# Patient Record
Sex: MALE | Race: WHITE | Employment: OTHER | URBAN - METROPOLITAN AREA
[De-identification: names, ages, dates, MRNs, and addresses within clinical notes are randomized per-mention and may not be internally consistent; named-entity substitution may affect disease eponyms.]

---

## 2019-01-01 ENCOUNTER — TELEPHONE (OUTPATIENT)
Dept: CARDIOLOGY CLINIC | Age: 69
End: 2019-01-01

## 2019-01-01 ENCOUNTER — PROCEDURE VISIT (OUTPATIENT)
Dept: CARDIOLOGY CLINIC | Age: 69
End: 2019-01-01

## 2019-01-01 ENCOUNTER — APPOINTMENT (OUTPATIENT)
Dept: INTERVENTIONAL RADIOLOGY/VASCULAR | Age: 69
DRG: 291 | End: 2019-01-01
Payer: COMMERCIAL

## 2019-01-01 ENCOUNTER — HOSPITAL ENCOUNTER (OUTPATIENT)
Age: 69
Discharge: HOME | End: 2019-09-09
Payer: COMMERCIAL

## 2019-01-01 ENCOUNTER — HOSPITAL ENCOUNTER (INPATIENT)
Age: 69
LOS: 5 days | Discharge: HOME | DRG: 291 | End: 2019-08-29
Attending: EMERGENCY MEDICINE | Admitting: HOSPITALIST
Payer: COMMERCIAL

## 2019-01-01 ENCOUNTER — OFFICE VISIT (OUTPATIENT)
Dept: CARDIOLOGY CLINIC | Age: 69
End: 2019-01-01
Payer: COMMERCIAL

## 2019-01-01 ENCOUNTER — NURSE ONLY (OUTPATIENT)
Dept: CARDIOLOGY CLINIC | Age: 69
End: 2019-01-01

## 2019-01-01 ENCOUNTER — INITIAL CONSULT (OUTPATIENT)
Dept: CARDIOLOGY CLINIC | Age: 69
End: 2019-01-01
Payer: COMMERCIAL

## 2019-01-01 ENCOUNTER — HOSPITAL ENCOUNTER (OUTPATIENT)
Age: 69
Discharge: HOME | End: 2019-10-03
Payer: COMMERCIAL

## 2019-01-01 ENCOUNTER — APPOINTMENT (OUTPATIENT)
Dept: ULTRASOUND IMAGING | Age: 69
DRG: 291 | End: 2019-01-01
Payer: COMMERCIAL

## 2019-01-01 ENCOUNTER — HOSPITAL ENCOUNTER (OUTPATIENT)
Dept: WOUND CARE | Age: 69
Discharge: HOME | End: 2019-11-13
Payer: COMMERCIAL

## 2019-01-01 ENCOUNTER — HOSPITAL ENCOUNTER (OUTPATIENT)
Age: 69
Discharge: HOME | End: 2019-09-03
Payer: COMMERCIAL

## 2019-01-01 ENCOUNTER — HOSPITAL ENCOUNTER (OUTPATIENT)
Dept: WOUND CARE | Age: 69
Discharge: HOME | End: 2019-11-06
Payer: COMMERCIAL

## 2019-01-01 ENCOUNTER — HOSPITAL ENCOUNTER (OUTPATIENT)
Dept: GENERAL RADIOLOGY | Age: 69
Discharge: HOME | End: 2019-10-03
Payer: COMMERCIAL

## 2019-01-01 ENCOUNTER — APPOINTMENT (OUTPATIENT)
Dept: GENERAL RADIOLOGY | Age: 69
DRG: 291 | End: 2019-01-01
Attending: THORACIC SURGERY (CARDIOTHORACIC VASCULAR SURGERY)
Payer: COMMERCIAL

## 2019-01-01 ENCOUNTER — PROCEDURE VISIT (OUTPATIENT)
Dept: CARDIOLOGY CLINIC | Age: 69
End: 2019-01-01
Payer: COMMERCIAL

## 2019-01-01 ENCOUNTER — APPOINTMENT (OUTPATIENT)
Dept: GENERAL RADIOLOGY | Age: 69
DRG: 291 | End: 2019-01-01
Payer: COMMERCIAL

## 2019-01-01 ENCOUNTER — HOSPITAL ENCOUNTER (INPATIENT)
Age: 69
LOS: 1 days | Discharge: HOME | DRG: 291 | End: 2019-05-01
Attending: EMERGENCY MEDICINE | Admitting: INTERNAL MEDICINE
Payer: COMMERCIAL

## 2019-01-01 ENCOUNTER — HOSPITAL ENCOUNTER (INPATIENT)
Age: 69
LOS: 2 days | Discharge: HOME HEALTH | DRG: 291 | End: 2019-10-12
Attending: THORACIC SURGERY (CARDIOTHORACIC VASCULAR SURGERY) | Admitting: INTERNAL MEDICINE
Payer: COMMERCIAL

## 2019-01-01 ENCOUNTER — APPOINTMENT (OUTPATIENT)
Dept: INTERVENTIONAL RADIOLOGY/VASCULAR | Age: 69
DRG: 981 | End: 2019-01-01
Attending: INTERNAL MEDICINE
Payer: COMMERCIAL

## 2019-01-01 ENCOUNTER — ANESTHESIA EVENT (OUTPATIENT)
Dept: OPERATING ROOM | Age: 69
End: 2019-01-01

## 2019-01-01 ENCOUNTER — HOSPITAL ENCOUNTER (INPATIENT)
Age: 69
LOS: 2 days | Discharge: HOME | DRG: 981 | End: 2019-09-11
Attending: INTERNAL MEDICINE | Admitting: INTERNAL MEDICINE
Payer: COMMERCIAL

## 2019-01-01 ENCOUNTER — HOSPITAL ENCOUNTER (OUTPATIENT)
Dept: PREADMISSION TESTING | Age: 69
Discharge: HOME | End: 2019-10-07
Payer: COMMERCIAL

## 2019-01-01 ENCOUNTER — ANESTHESIA (OUTPATIENT)
Dept: OPERATING ROOM | Age: 69
End: 2019-01-01

## 2019-01-01 ENCOUNTER — HOSPITAL ENCOUNTER (EMERGENCY)
Age: 69
End: 2019-11-18
Attending: EMERGENCY MEDICINE
Payer: COMMERCIAL

## 2019-01-01 VITALS
TEMPERATURE: 97 F | SYSTOLIC BLOOD PRESSURE: 106 MMHG | HEART RATE: 98 BPM | RESPIRATION RATE: 20 BRPM | DIASTOLIC BLOOD PRESSURE: 77 MMHG

## 2019-01-01 VITALS
SYSTOLIC BLOOD PRESSURE: 117 MMHG | BODY MASS INDEX: 28.59 KG/M2 | HEART RATE: 79 BPM | RESPIRATION RATE: 15 BRPM | HEIGHT: 70 IN | WEIGHT: 199.7 LBS | DIASTOLIC BLOOD PRESSURE: 37 MMHG | TEMPERATURE: 97.6 F | OXYGEN SATURATION: 96 %

## 2019-01-01 VITALS
BODY MASS INDEX: 29.35 KG/M2 | OXYGEN SATURATION: 98 % | TEMPERATURE: 98.5 F | SYSTOLIC BLOOD PRESSURE: 115 MMHG | HEART RATE: 78 BPM | DIASTOLIC BLOOD PRESSURE: 45 MMHG | WEIGHT: 205 LBS | RESPIRATION RATE: 20 BRPM | HEIGHT: 70 IN

## 2019-01-01 VITALS
WEIGHT: 205 LBS | OXYGEN SATURATION: 98 % | BODY MASS INDEX: 29.35 KG/M2 | SYSTOLIC BLOOD PRESSURE: 131 MMHG | HEART RATE: 60 BPM | TEMPERATURE: 97.9 F | DIASTOLIC BLOOD PRESSURE: 98 MMHG | RESPIRATION RATE: 15 BRPM | HEIGHT: 70 IN

## 2019-01-01 VITALS
SYSTOLIC BLOOD PRESSURE: 100 MMHG | WEIGHT: 205 LBS | BODY MASS INDEX: 29.35 KG/M2 | HEIGHT: 70 IN | HEART RATE: 101 BPM | DIASTOLIC BLOOD PRESSURE: 64 MMHG

## 2019-01-01 VITALS
BODY MASS INDEX: 28.88 KG/M2 | TEMPERATURE: 97.9 F | RESPIRATION RATE: 16 BRPM | SYSTOLIC BLOOD PRESSURE: 130 MMHG | WEIGHT: 201.72 LBS | DIASTOLIC BLOOD PRESSURE: 92 MMHG | HEIGHT: 70 IN | OXYGEN SATURATION: 99 % | HEART RATE: 66 BPM

## 2019-01-01 VITALS — TEMPERATURE: 97.2 F | SYSTOLIC BLOOD PRESSURE: 108 MMHG | DIASTOLIC BLOOD PRESSURE: 60 MMHG

## 2019-01-01 VITALS
DIASTOLIC BLOOD PRESSURE: 68 MMHG | HEART RATE: 78 BPM | HEIGHT: 70 IN | OXYGEN SATURATION: 99 % | SYSTOLIC BLOOD PRESSURE: 118 MMHG | BODY MASS INDEX: 28.72 KG/M2 | RESPIRATION RATE: 16 BRPM | WEIGHT: 200.6 LBS

## 2019-01-01 VITALS
BODY MASS INDEX: 29.15 KG/M2 | HEIGHT: 70 IN | WEIGHT: 203.6 LBS | HEART RATE: 72 BPM | DIASTOLIC BLOOD PRESSURE: 62 MMHG | SYSTOLIC BLOOD PRESSURE: 128 MMHG

## 2019-01-01 VITALS
RESPIRATION RATE: 20 BRPM | WEIGHT: 197.31 LBS | HEIGHT: 70 IN | BODY MASS INDEX: 28.25 KG/M2 | OXYGEN SATURATION: 92 % | SYSTOLIC BLOOD PRESSURE: 107 MMHG | DIASTOLIC BLOOD PRESSURE: 79 MMHG | HEART RATE: 82 BPM | TEMPERATURE: 97.9 F

## 2019-01-01 VITALS
HEIGHT: 70 IN | HEART RATE: 80 BPM | BODY MASS INDEX: 29.92 KG/M2 | DIASTOLIC BLOOD PRESSURE: 72 MMHG | SYSTOLIC BLOOD PRESSURE: 122 MMHG | WEIGHT: 209 LBS

## 2019-01-01 VITALS
BODY MASS INDEX: 30.06 KG/M2 | OXYGEN SATURATION: 98 % | RESPIRATION RATE: 12 BRPM | HEIGHT: 70 IN | HEART RATE: 74 BPM | SYSTOLIC BLOOD PRESSURE: 116 MMHG | DIASTOLIC BLOOD PRESSURE: 62 MMHG | WEIGHT: 210 LBS

## 2019-01-01 VITALS
BODY MASS INDEX: 28.56 KG/M2 | SYSTOLIC BLOOD PRESSURE: 90 MMHG | HEIGHT: 70 IN | HEART RATE: 70 BPM | WEIGHT: 199.5 LBS | DIASTOLIC BLOOD PRESSURE: 50 MMHG

## 2019-01-01 VITALS
SYSTOLIC BLOOD PRESSURE: 130 MMHG | WEIGHT: 213 LBS | DIASTOLIC BLOOD PRESSURE: 76 MMHG | BODY MASS INDEX: 30.49 KG/M2 | HEART RATE: 55 BPM | HEIGHT: 70 IN

## 2019-01-01 VITALS — TEMPERATURE: 96.7 F

## 2019-01-01 DIAGNOSIS — I87.333 CHRONIC VENOUS HYPERTENSION (IDIOPATHIC) WITH ULCER AND INFLAMMATION OF BILATERAL LOWER EXTREMITY (CODE) (HCC): ICD-10-CM

## 2019-01-01 DIAGNOSIS — R06.02 SHORTNESS OF BREATH: ICD-10-CM

## 2019-01-01 DIAGNOSIS — E78.2 MIXED HYPERLIPIDEMIA: ICD-10-CM

## 2019-01-01 DIAGNOSIS — N18.9 CHRONIC KIDNEY DISEASE, UNSPECIFIED CKD STAGE: ICD-10-CM

## 2019-01-01 DIAGNOSIS — I25.5 ISCHEMIC CARDIOMYOPATHY: ICD-10-CM

## 2019-01-01 DIAGNOSIS — I73.9 CLAUDICATION (HCC): Primary | ICD-10-CM

## 2019-01-01 DIAGNOSIS — I25.10 CORONARY ARTERY DISEASE INVOLVING NATIVE CORONARY ARTERY OF NATIVE HEART WITHOUT ANGINA PECTORIS: ICD-10-CM

## 2019-01-01 DIAGNOSIS — R94.31 ABNORMAL EKG: Primary | ICD-10-CM

## 2019-01-01 DIAGNOSIS — I50.42 CHRONIC COMBINED SYSTOLIC AND DIASTOLIC CONGESTIVE HEART FAILURE (HCC): ICD-10-CM

## 2019-01-01 DIAGNOSIS — I25.810 CORONARY ARTERY DISEASE INVOLVING AUTOLOGOUS VEIN CORONARY BYPASS GRAFT WITHOUT ANGINA PECTORIS: ICD-10-CM

## 2019-01-01 DIAGNOSIS — I50.9 ACUTE HEART FAILURE, UNSPECIFIED HEART FAILURE TYPE (HCC): Primary | ICD-10-CM

## 2019-01-01 DIAGNOSIS — Z95.810 STATUS POST IMPLANTATION OF AUTOMATIC CARDIOVERTER/DEFIBRILLATOR (AICD): Primary | ICD-10-CM

## 2019-01-01 DIAGNOSIS — E11.00 TYPE 2 DIABETES MELLITUS WITH HYPEROSMOLARITY WITHOUT COMA, WITHOUT LONG-TERM CURRENT USE OF INSULIN (HCC): ICD-10-CM

## 2019-01-01 DIAGNOSIS — L97.822 NON-PRESSURE CHRONIC ULCER OF OTHER PART OF LEFT LOWER LEG WITH FAT LAYER EXPOSED (HCC): Primary | ICD-10-CM

## 2019-01-01 DIAGNOSIS — L97.812 NON-PRESSURE CHRONIC ULCER OF OTHER PART OF RIGHT LOWER LEG WITH FAT LAYER EXPOSED (HCC): ICD-10-CM

## 2019-01-01 DIAGNOSIS — Z95.810 ICD (IMPLANTABLE CARDIOVERTER-DEFIBRILLATOR), DUAL, IN SITU: Primary | ICD-10-CM

## 2019-01-01 DIAGNOSIS — I25.5 ISCHEMIC CARDIOMYOPATHY: Primary | ICD-10-CM

## 2019-01-01 DIAGNOSIS — E87.70 HYPERVOLEMIA, UNSPECIFIED HYPERVOLEMIA TYPE: ICD-10-CM

## 2019-01-01 DIAGNOSIS — R60.0 LEG EDEMA: ICD-10-CM

## 2019-01-01 DIAGNOSIS — N18.6 ESRD (END STAGE RENAL DISEASE) (HCC): ICD-10-CM

## 2019-01-01 DIAGNOSIS — I10 ESSENTIAL HYPERTENSION: ICD-10-CM

## 2019-01-01 DIAGNOSIS — L97.822 NON-PRESSURE CHRONIC ULCER OF OTHER PART OF LEFT LOWER LEG WITH FAT LAYER EXPOSED (HCC): ICD-10-CM

## 2019-01-01 DIAGNOSIS — K59.00 CONSTIPATION, UNSPECIFIED CONSTIPATION TYPE: ICD-10-CM

## 2019-01-01 DIAGNOSIS — R77.8 ELEVATED TROPONIN: ICD-10-CM

## 2019-01-01 DIAGNOSIS — Z86.79 HX OF LEFT BUNDLE BRANCH BLOCK: ICD-10-CM

## 2019-01-01 DIAGNOSIS — Z95.810 ICD (IMPLANTABLE CARDIOVERTER-DEFIBRILLATOR), DUAL, IN SITU: ICD-10-CM

## 2019-01-01 DIAGNOSIS — I46.9 CARDIAC ARREST (HCC): Primary | ICD-10-CM

## 2019-01-01 DIAGNOSIS — I73.9 CLAUDICATION (HCC): ICD-10-CM

## 2019-01-01 DIAGNOSIS — I10 ESSENTIAL HYPERTENSION: Primary | ICD-10-CM

## 2019-01-01 DIAGNOSIS — Z45.02 ICD (IMPLANTABLE CARDIOVERTER-DEFIBRILLATOR) BATTERY DEPLETION: Primary | ICD-10-CM

## 2019-01-01 DIAGNOSIS — I50.42 CHRONIC COMBINED SYSTOLIC AND DIASTOLIC CONGESTIVE HEART FAILURE (HCC): Primary | ICD-10-CM

## 2019-01-01 DIAGNOSIS — R06.00 DYSPNEA, UNSPECIFIED TYPE: Primary | ICD-10-CM

## 2019-01-01 DIAGNOSIS — Z95.810 ICD (IMPLANTABLE CARDIOVERTER-DEFIBRILLATOR) IN PLACE: ICD-10-CM

## 2019-01-01 LAB
ABO/RH: NORMAL
ACID FAST SMEAR: NORMAL
ALBUMIN SERPL-MCNC: 3.7 GM/DL (ref 3.4–5)
ALBUMIN SERPL-MCNC: 3.7 GM/DL (ref 3.4–5)
ALBUMIN SERPL-MCNC: 3.8 GM/DL (ref 3.4–5)
ALP BLD-CCNC: 116 IU/L (ref 40–129)
ALP BLD-CCNC: 152 IU/L (ref 40–128)
ALP BLD-CCNC: 168 IU/L (ref 40–128)
ALP BLD-CCNC: 169 IU/L (ref 40–129)
ALT SERPL-CCNC: 21 U/L (ref 10–40)
ALT SERPL-CCNC: 33 U/L (ref 10–40)
ALT SERPL-CCNC: 43 U/L (ref 10–40)
ALT SERPL-CCNC: 44 U/L (ref 10–40)
ANION GAP SERPL CALCULATED.3IONS-SCNC: 12 MMOL/L (ref 4–16)
ANION GAP SERPL CALCULATED.3IONS-SCNC: 13 MMOL/L (ref 4–16)
ANION GAP SERPL CALCULATED.3IONS-SCNC: 14 MMOL/L (ref 4–16)
ANION GAP SERPL CALCULATED.3IONS-SCNC: 15 MMOL/L (ref 4–16)
ANION GAP SERPL CALCULATED.3IONS-SCNC: 16 MMOL/L (ref 4–16)
ANION GAP SERPL CALCULATED.3IONS-SCNC: 17 MMOL/L (ref 4–16)
ANTIBODY SCREEN: NEGATIVE
APTT: 34.5 SECONDS (ref 21.2–33)
APTT: 35.4 SECONDS (ref 21.2–33)
AST SERPL-CCNC: 20 IU/L (ref 15–37)
AST SERPL-CCNC: 35 IU/L (ref 15–37)
AST SERPL-CCNC: 36 IU/L (ref 15–37)
AST SERPL-CCNC: 40 IU/L (ref 15–37)
BACTERIA: NEGATIVE /HPF
BASOPHILS ABSOLUTE: 0 K/CU MM
BASOPHILS ABSOLUTE: NORMAL /ΜL
BASOPHILS RELATIVE PERCENT: 0.2 % (ref 0–1)
BASOPHILS RELATIVE PERCENT: 0.2 % (ref 0–1)
BASOPHILS RELATIVE PERCENT: 0.3 % (ref 0–1)
BASOPHILS RELATIVE PERCENT: 0.3 % (ref 0–1)
BASOPHILS RELATIVE PERCENT: 0.4 % (ref 0–1)
BASOPHILS RELATIVE PERCENT: NORMAL %
BILIRUB SERPL-MCNC: 0.3 MG/DL (ref 0–1)
BILIRUB SERPL-MCNC: 0.4 MG/DL (ref 0–1)
BILIRUB SERPL-MCNC: 0.5 MG/DL (ref 0–1)
BILIRUB SERPL-MCNC: 1.1 MG/DL (ref 0–1)
BILIRUBIN URINE: NEGATIVE MG/DL
BLOOD, URINE: ABNORMAL
BUN BLDV-MCNC: 105 MG/DL (ref 6–23)
BUN BLDV-MCNC: 105 MG/DL (ref 6–23)
BUN BLDV-MCNC: 107 MG/DL (ref 6–23)
BUN BLDV-MCNC: 121 MG/DL (ref 6–23)
BUN BLDV-MCNC: 122 MG/DL (ref 6–23)
BUN BLDV-MCNC: 123 MG/DL (ref 6–23)
BUN BLDV-MCNC: 124 MG/DL (ref 6–23)
BUN BLDV-MCNC: 126 MG/DL (ref 6–23)
BUN BLDV-MCNC: 126 MG/DL (ref 6–23)
BUN BLDV-MCNC: 140 MG/DL (ref 6–23)
BUN BLDV-MCNC: 167 MG/DL (ref 6–23)
BUN BLDV-MCNC: 169 MG/DL (ref 6–23)
BUN BLDV-MCNC: 52 MG/DL (ref 6–23)
BUN BLDV-MCNC: 74 MG/DL (ref 6–23)
BUN BLDV-MCNC: 77 MG/DL (ref 6–23)
BUN BLDV-MCNC: 84 MG/DL (ref 6–23)
BUN BLDV-MCNC: 89 MG/DL
BUN BLDV-MCNC: 92 MG/DL (ref 6–23)
CALCIUM SERPL-MCNC: 10 MG/DL (ref 8.3–10.6)
CALCIUM SERPL-MCNC: 8.3 MG/DL (ref 8.3–10.6)
CALCIUM SERPL-MCNC: 8.5 MG/DL (ref 8.3–10.6)
CALCIUM SERPL-MCNC: 8.5 MG/DL (ref 8.3–10.6)
CALCIUM SERPL-MCNC: 8.6 MG/DL (ref 8.3–10.6)
CALCIUM SERPL-MCNC: 8.6 MG/DL (ref 8.3–10.6)
CALCIUM SERPL-MCNC: 8.8 MG/DL (ref 8.3–10.6)
CALCIUM SERPL-MCNC: 8.8 MG/DL (ref 8.3–10.6)
CALCIUM SERPL-MCNC: 8.9 MG/DL (ref 8.3–10.6)
CALCIUM SERPL-MCNC: 9 MG/DL (ref 8.3–10.6)
CALCIUM SERPL-MCNC: 9.2 MG/DL (ref 8.3–10.6)
CALCIUM SERPL-MCNC: 9.2 MG/DL (ref 8.3–10.6)
CALCIUM SERPL-MCNC: 9.3 MG/DL (ref 8.3–10.6)
CALCIUM SERPL-MCNC: 9.4 MG/DL
CALCIUM SERPL-MCNC: 9.6 MG/DL (ref 8.3–10.6)
CALCIUM SERPL-MCNC: 9.6 MG/DL (ref 8.3–10.6)
CHLORIDE BLD-SCNC: 104 MMOL/L (ref 99–110)
CHLORIDE BLD-SCNC: 106 MMOL/L (ref 99–110)
CHLORIDE BLD-SCNC: 107 MMOL/L
CHLORIDE BLD-SCNC: 107 MMOL/L (ref 99–110)
CHLORIDE BLD-SCNC: 78 MMOL/L (ref 99–110)
CHLORIDE BLD-SCNC: 81 MMOL/L (ref 99–110)
CHLORIDE BLD-SCNC: 88 MMOL/L (ref 99–110)
CHLORIDE BLD-SCNC: 89 MMOL/L (ref 99–110)
CHLORIDE BLD-SCNC: 90 MMOL/L (ref 99–110)
CHLORIDE BLD-SCNC: 92 MMOL/L (ref 99–110)
CHLORIDE BLD-SCNC: 98 MMOL/L (ref 99–110)
CHLORIDE BLD-SCNC: 99 MMOL/L (ref 99–110)
CHLORIDE URINE RANDOM: 63 MMOL/L (ref 43–210)
CHOLESTEROL: 87 MG/DL
CHOLESTEROL: 94 MG/DL
CLARITY: CLEAR
CO2: 17 MMOL/L (ref 21–32)
CO2: 19 MMOL/L (ref 21–32)
CO2: 20 MMOL/L (ref 21–32)
CO2: 22 MMOL/L (ref 21–32)
CO2: 23 MMOL/L (ref 21–32)
CO2: 24 MMOL/L
CO2: 26 MMOL/L (ref 21–32)
CO2: 27 MMOL/L (ref 21–32)
CO2: 30 MMOL/L (ref 21–32)
CO2: 33 MMOL/L (ref 21–32)
CO2: 37 MMOL/L (ref 21–32)
CO2: 37 MMOL/L (ref 21–32)
COLOR: ABNORMAL
COMMENT: NORMAL
CREAT SERPL-MCNC: 2.9 MG/DL
CREAT SERPL-MCNC: 3.1 MG/DL (ref 0.9–1.3)
CREAT SERPL-MCNC: 3.2 MG/DL (ref 0.9–1.3)
CREAT SERPL-MCNC: 3.4 MG/DL (ref 0.9–1.3)
CREAT SERPL-MCNC: 3.4 MG/DL (ref 0.9–1.3)
CREAT SERPL-MCNC: 3.6 MG/DL (ref 0.9–1.3)
CREAT SERPL-MCNC: 3.7 MG/DL (ref 0.9–1.3)
CREAT SERPL-MCNC: 3.7 MG/DL (ref 0.9–1.3)
CREAT SERPL-MCNC: 3.8 MG/DL (ref 0.9–1.3)
CREAT SERPL-MCNC: 3.8 MG/DL (ref 0.9–1.3)
CREAT SERPL-MCNC: 4 MG/DL (ref 0.9–1.3)
CREAT SERPL-MCNC: 4.1 MG/DL (ref 0.9–1.3)
CREAT SERPL-MCNC: 4.6 MG/DL (ref 0.9–1.3)
CREAT SERPL-MCNC: 4.9 MG/DL (ref 0.9–1.3)
CULTURE: ABNORMAL
CULTURE: ABNORMAL
DIFFERENTIAL TYPE: ABNORMAL
EKG ATRIAL RATE: 136 BPM
EKG ATRIAL RATE: 73 BPM
EKG ATRIAL RATE: 78 BPM
EKG ATRIAL RATE: 78 BPM
EKG ATRIAL RATE: 83 BPM
EKG ATRIAL RATE: 84 BPM
EKG DIAGNOSIS: NORMAL
EKG P AXIS: 28 DEGREES
EKG P AXIS: 36 DEGREES
EKG P AXIS: 54 DEGREES
EKG P AXIS: 7 DEGREES
EKG P AXIS: 71 DEGREES
EKG P-R INTERVAL: 158 MS
EKG P-R INTERVAL: 176 MS
EKG P-R INTERVAL: 176 MS
EKG P-R INTERVAL: 182 MS
EKG P-R INTERVAL: 184 MS
EKG Q-T INTERVAL: 344 MS
EKG Q-T INTERVAL: 380 MS
EKG Q-T INTERVAL: 382 MS
EKG Q-T INTERVAL: 390 MS
EKG Q-T INTERVAL: 394 MS
EKG Q-T INTERVAL: 404 MS
EKG QRS DURATION: 106 MS
EKG QRS DURATION: 112 MS
EKG QRS DURATION: 118 MS
EKG QRS DURATION: 120 MS
EKG QRS DURATION: 128 MS
EKG QRS DURATION: 130 MS
EKG QTC CALCULATION (BAZETT): 434 MS
EKG QTC CALCULATION (BAZETT): 444 MS
EKG QTC CALCULATION (BAZETT): 448 MS
EKG QTC CALCULATION (BAZETT): 449 MS
EKG QTC CALCULATION (BAZETT): 460 MS
EKG QTC CALCULATION (BAZETT): 509 MS
EKG R AXIS: -3 DEGREES
EKG R AXIS: -4 DEGREES
EKG R AXIS: -7 DEGREES
EKG R AXIS: 18 DEGREES
EKG R AXIS: 68 DEGREES
EKG R AXIS: 7 DEGREES
EKG T AXIS: 151 DEGREES
EKG T AXIS: 155 DEGREES
EKG T AXIS: 159 DEGREES
EKG T AXIS: 161 DEGREES
EKG T AXIS: 171 DEGREES
EKG T AXIS: 247 DEGREES
EKG VENTRICULAR RATE: 132 BPM
EKG VENTRICULAR RATE: 73 BPM
EKG VENTRICULAR RATE: 78 BPM
EKG VENTRICULAR RATE: 78 BPM
EKG VENTRICULAR RATE: 83 BPM
EKG VENTRICULAR RATE: 84 BPM
EOSINOPHILS ABSOLUTE: 0.2 K/CU MM
EOSINOPHILS ABSOLUTE: 0.3 K/CU MM
EOSINOPHILS ABSOLUTE: 0.8 K/CU MM
EOSINOPHILS ABSOLUTE: 0.9 K/CU MM
EOSINOPHILS ABSOLUTE: 1 K/CU MM
EOSINOPHILS ABSOLUTE: NORMAL /ΜL
EOSINOPHILS RELATIVE PERCENT: 10.1 % (ref 0–3)
EOSINOPHILS RELATIVE PERCENT: 11.6 % (ref 0–3)
EOSINOPHILS RELATIVE PERCENT: 3 % (ref 0–3)
EOSINOPHILS RELATIVE PERCENT: 3.4 % (ref 0–3)
EOSINOPHILS RELATIVE PERCENT: 9.6 % (ref 0–3)
EOSINOPHILS RELATIVE PERCENT: NORMAL %
ESTIMATED AVERAGE GLUCOSE: 148 MG/DL
FINAL RESULT: NORMAL
FLUID TYPE: NORMAL INDEX
FLUID TYPE: NORMAL INDEX
GFR AFRICAN AMERICAN: 14 ML/MIN/1.73M2
GFR AFRICAN AMERICAN: 15 ML/MIN/1.73M2
GFR AFRICAN AMERICAN: 18 ML/MIN/1.73M2
GFR AFRICAN AMERICAN: 18 ML/MIN/1.73M2
GFR AFRICAN AMERICAN: 19 ML/MIN/1.73M2
GFR AFRICAN AMERICAN: 19 ML/MIN/1.73M2
GFR AFRICAN AMERICAN: 20 ML/MIN/1.73M2
GFR AFRICAN AMERICAN: 22 ML/MIN/1.73M2
GFR AFRICAN AMERICAN: 22 ML/MIN/1.73M2
GFR AFRICAN AMERICAN: 23 ML/MIN/1.73M2
GFR AFRICAN AMERICAN: 24 ML/MIN/1.73M2
GFR CALCULATED: NORMAL
GFR NON-AFRICAN AMERICAN: 12 ML/MIN/1.73M2
GFR NON-AFRICAN AMERICAN: 13 ML/MIN/1.73M2
GFR NON-AFRICAN AMERICAN: 15 ML/MIN/1.73M2
GFR NON-AFRICAN AMERICAN: 15 ML/MIN/1.73M2
GFR NON-AFRICAN AMERICAN: 16 ML/MIN/1.73M2
GFR NON-AFRICAN AMERICAN: 17 ML/MIN/1.73M2
GFR NON-AFRICAN AMERICAN: 18 ML/MIN/1.73M2
GFR NON-AFRICAN AMERICAN: 18 ML/MIN/1.73M2
GFR NON-AFRICAN AMERICAN: 19 ML/MIN/1.73M2
GFR NON-AFRICAN AMERICAN: 20 ML/MIN/1.73M2
GLUCOSE BLD-MCNC: 100 MG/DL (ref 70–99)
GLUCOSE BLD-MCNC: 103 MG/DL (ref 70–99)
GLUCOSE BLD-MCNC: 107 MG/DL (ref 70–99)
GLUCOSE BLD-MCNC: 109 MG/DL (ref 70–99)
GLUCOSE BLD-MCNC: 111 MG/DL (ref 70–99)
GLUCOSE BLD-MCNC: 116 MG/DL (ref 70–99)
GLUCOSE BLD-MCNC: 119 MG/DL (ref 70–99)
GLUCOSE BLD-MCNC: 122 MG/DL
GLUCOSE BLD-MCNC: 123 MG/DL (ref 70–99)
GLUCOSE BLD-MCNC: 124 MG/DL (ref 70–99)
GLUCOSE BLD-MCNC: 124 MG/DL (ref 70–99)
GLUCOSE BLD-MCNC: 125 MG/DL (ref 70–99)
GLUCOSE BLD-MCNC: 126 MG/DL (ref 70–99)
GLUCOSE BLD-MCNC: 129 MG/DL (ref 70–99)
GLUCOSE BLD-MCNC: 131 MG/DL (ref 70–99)
GLUCOSE BLD-MCNC: 132 MG/DL (ref 70–99)
GLUCOSE BLD-MCNC: 135 MG/DL (ref 70–99)
GLUCOSE BLD-MCNC: 142 MG/DL (ref 70–99)
GLUCOSE BLD-MCNC: 143 MG/DL (ref 70–99)
GLUCOSE BLD-MCNC: 148 MG/DL (ref 70–99)
GLUCOSE BLD-MCNC: 150 MG/DL (ref 70–99)
GLUCOSE BLD-MCNC: 150 MG/DL (ref 70–99)
GLUCOSE BLD-MCNC: 152 MG/DL (ref 70–99)
GLUCOSE BLD-MCNC: 152 MG/DL (ref 70–99)
GLUCOSE BLD-MCNC: 153 MG/DL (ref 70–99)
GLUCOSE BLD-MCNC: 156 MG/DL (ref 70–99)
GLUCOSE BLD-MCNC: 157 MG/DL (ref 70–99)
GLUCOSE BLD-MCNC: 158 MG/DL (ref 70–99)
GLUCOSE BLD-MCNC: 176 MG/DL (ref 70–99)
GLUCOSE BLD-MCNC: 192 MG/DL (ref 70–99)
GLUCOSE BLD-MCNC: 192 MG/DL (ref 70–99)
GLUCOSE BLD-MCNC: 193 MG/DL (ref 70–99)
GLUCOSE BLD-MCNC: 232 MG/DL (ref 70–99)
GLUCOSE BLD-MCNC: 241 MG/DL (ref 70–99)
GLUCOSE BLD-MCNC: 273 MG/DL (ref 70–99)
GLUCOSE BLD-MCNC: 58 MG/DL (ref 70–99)
GLUCOSE BLD-MCNC: 59 MG/DL (ref 70–99)
GLUCOSE BLD-MCNC: 65 MG/DL (ref 70–99)
GLUCOSE BLD-MCNC: 84 MG/DL (ref 70–99)
GLUCOSE BLD-MCNC: 91 MG/DL (ref 70–99)
GLUCOSE BLD-MCNC: 93 MG/DL (ref 70–99)
GLUCOSE BLD-MCNC: 94 MG/DL (ref 70–99)
GLUCOSE BLD-MCNC: 95 MG/DL (ref 70–99)
GLUCOSE BLD-MCNC: 95 MG/DL (ref 70–99)
GLUCOSE BLD-MCNC: 97 MG/DL (ref 70–99)
GLUCOSE BLD-MCNC: 98 MG/DL (ref 70–99)
GLUCOSE FASTING: 135 MG/DL (ref 70–99)
GLUCOSE, FLUID: 145 MG/DL
GLUCOSE, FLUID: 153 MG/DL
GLUCOSE, URINE: NEGATIVE MG/DL
GRAM SMEAR: ABNORMAL
HBA1C MFR BLD: 6.8 % (ref 4.2–6.3)
HBV SURFACE AB TITR SER: <3.5 {TITER}
HCT VFR BLD CALC: 35.3 % (ref 42–52)
HCT VFR BLD CALC: 35.9 % (ref 42–52)
HCT VFR BLD CALC: 36.2 % (ref 42–52)
HCT VFR BLD CALC: 36.6 % (ref 42–52)
HCT VFR BLD CALC: 37.1 % (ref 42–52)
HCT VFR BLD CALC: 38 % (ref 42–52)
HCT VFR BLD CALC: 38.1 % (ref 42–52)
HCT VFR BLD CALC: 38.5 % (ref 42–52)
HCT VFR BLD CALC: 39.1 % (ref 42–52)
HCT VFR BLD CALC: 39.2 % (ref 42–52)
HCT VFR BLD CALC: 42 % (ref 41–53)
HDLC SERPL-MCNC: 43 MG/DL
HDLC SERPL-MCNC: 45 MG/DL
HEMOGLOBIN: 11.1 GM/DL (ref 13.5–18)
HEMOGLOBIN: 11.1 GM/DL (ref 13.5–18)
HEMOGLOBIN: 11.3 GM/DL (ref 13.5–18)
HEMOGLOBIN: 11.5 GM/DL (ref 13.5–18)
HEMOGLOBIN: 11.7 GM/DL (ref 13.5–18)
HEMOGLOBIN: 12.1 GM/DL (ref 13.5–18)
HEMOGLOBIN: 12.2 GM/DL (ref 13.5–18)
HEMOGLOBIN: 12.6 GM/DL (ref 13.5–18)
HEMOGLOBIN: 14.3 G/DL (ref 13.5–17.5)
HEPATITIS B CORE TOTAL ANTIBODY: NEGATIVE
HEPATITIS B CORE TOTAL ANTIBODY: NORMAL
HEPATITIS B SURFACE ANTIGEN: NON REACTIVE
IMMATURE NEUTROPHIL %: 0.2 % (ref 0–0.43)
IMMATURE NEUTROPHIL %: 0.2 % (ref 0–0.43)
IMMATURE NEUTROPHIL %: 0.4 % (ref 0–0.43)
INR BLD: 1.07 INDEX
INR BLD: 1.09 INDEX
INR BLD: 1.11 INDEX
KETONES, URINE: NEGATIVE MG/DL
LACTATE DEHYDROGENASE, FLUID: 47 IU/L
LACTATE DEHYDROGENASE, FLUID: 64 IU/L
LDL CHOLESTEROL DIRECT: 36 MG/DL
LDL CHOLESTEROL DIRECT: 47 MG/DL
LEUKOCYTE ESTERASE, URINE: NEGATIVE
LV EF: 22 %
LV EF: 23 %
LVEF MODALITY: NORMAL
LVEF MODALITY: NORMAL
LYMPHOCYTES ABSOLUTE: 0.6 K/CU MM
LYMPHOCYTES ABSOLUTE: 0.6 K/CU MM
LYMPHOCYTES ABSOLUTE: 0.7 K/CU MM
LYMPHOCYTES ABSOLUTE: 0.7 K/CU MM
LYMPHOCYTES ABSOLUTE: 1.1 K/CU MM
LYMPHOCYTES ABSOLUTE: NORMAL /ΜL
LYMPHOCYTES RELATIVE PERCENT: 13.9 % (ref 24–44)
LYMPHOCYTES RELATIVE PERCENT: 6.7 % (ref 24–44)
LYMPHOCYTES RELATIVE PERCENT: 7 % (ref 24–44)
LYMPHOCYTES RELATIVE PERCENT: 7 % (ref 24–44)
LYMPHOCYTES RELATIVE PERCENT: 9.3 % (ref 24–44)
LYMPHOCYTES RELATIVE PERCENT: NORMAL %
LYMPHOCYTES, BODY FLUID: 60 %
LYMPHOCYTES, BODY FLUID: 63 %
Lab: ABNORMAL
Lab: ABNORMAL
MAGNESIUM: 2.1 MG/DL (ref 1.8–2.4)
MAGNESIUM: 2.2 MG/DL (ref 1.8–2.4)
MAGNESIUM: 2.2 MG/DL (ref 1.8–2.4)
MCH RBC QN AUTO: 27 PG (ref 27–31)
MCH RBC QN AUTO: 27.8 PG (ref 27–31)
MCH RBC QN AUTO: 28.2 PG (ref 27–31)
MCH RBC QN AUTO: 28.4 PG (ref 27–31)
MCH RBC QN AUTO: 28.5 PG (ref 27–31)
MCH RBC QN AUTO: 28.6 PG (ref 27–31)
MCH RBC QN AUTO: 28.7 PG (ref 27–31)
MCH RBC QN AUTO: 28.7 PG (ref 27–31)
MCH RBC QN AUTO: 29.3 PG (ref 27–31)
MCH RBC QN AUTO: 29.6 PG (ref 27–31)
MCH RBC QN AUTO: NORMAL PG
MCHC RBC AUTO-ENTMCNC: 30.9 % (ref 32–36)
MCHC RBC AUTO-ENTMCNC: 30.9 % (ref 32–36)
MCHC RBC AUTO-ENTMCNC: 31.2 % (ref 32–36)
MCHC RBC AUTO-ENTMCNC: 31.4 % (ref 32–36)
MCHC RBC AUTO-ENTMCNC: 31.4 % (ref 32–36)
MCHC RBC AUTO-ENTMCNC: 31.5 % (ref 32–36)
MCHC RBC AUTO-ENTMCNC: 31.7 % (ref 32–36)
MCHC RBC AUTO-ENTMCNC: 32 % (ref 32–36)
MCHC RBC AUTO-ENTMCNC: 32.1 % (ref 32–36)
MCHC RBC AUTO-ENTMCNC: 32.2 % (ref 32–36)
MCHC RBC AUTO-ENTMCNC: NORMAL G/DL
MCV RBC AUTO: 87.3 FL (ref 78–100)
MCV RBC AUTO: 88.5 FL (ref 78–100)
MCV RBC AUTO: 89.2 FL (ref 78–100)
MCV RBC AUTO: 89.6 FL (ref 78–100)
MCV RBC AUTO: 89.7 FL (ref 78–100)
MCV RBC AUTO: 90.6 FL (ref 78–100)
MCV RBC AUTO: 91 FL (ref 78–100)
MCV RBC AUTO: 91.8 FL (ref 78–100)
MCV RBC AUTO: 92.5 FL (ref 78–100)
MCV RBC AUTO: 93 FL (ref 78–100)
MCV RBC AUTO: NORMAL FL
MESOTHELIAL FLUID: 4 /100 WBC
MESOTHELIAL FLUID: 4 /100 WBC
MONOCYTE, FLUID: 16 %
MONOCYTE, FLUID: 24 %
MONOCYTES ABSOLUTE: 0.7 K/CU MM
MONOCYTES ABSOLUTE: 0.8 K/CU MM
MONOCYTES ABSOLUTE: 0.9 K/CU MM
MONOCYTES ABSOLUTE: NORMAL /ΜL
MONOCYTES RELATIVE PERCENT: 10 % (ref 0–4)
MONOCYTES RELATIVE PERCENT: 7.7 % (ref 0–4)
MONOCYTES RELATIVE PERCENT: 8 % (ref 0–4)
MONOCYTES RELATIVE PERCENT: 9.4 % (ref 0–4)
MONOCYTES RELATIVE PERCENT: 9.9 % (ref 0–4)
MONOCYTES RELATIVE PERCENT: NORMAL %
MUCUS: ABNORMAL HPF
NEUTROPHIL, FLUID: 4 %
NEUTROPHIL, FLUID: 7 %
NEUTROPHILS ABSOLUTE: NORMAL /ΜL
NEUTROPHILS RELATIVE PERCENT: NORMAL %
NITRITE URINE, QUANTITATIVE: NEGATIVE
NUCLEATED RBC %: 0 %
OTHER CELLS FLUID: NORMAL
OTHER CELLS FLUID: NORMAL
PDW BLD-RTO: 14.5 % (ref 11.7–14.9)
PDW BLD-RTO: 14.6 % (ref 11.7–14.9)
PDW BLD-RTO: 14.6 % (ref 11.7–14.9)
PDW BLD-RTO: 14.7 % (ref 11.7–14.9)
PDW BLD-RTO: 14.7 % (ref 11.7–14.9)
PDW BLD-RTO: 14.8 % (ref 11.7–14.9)
PDW BLD-RTO: 15 % (ref 11.7–14.9)
PDW BLD-RTO: 15.5 % (ref 11.7–14.9)
PH FLUID: 8
PH FLUID: 8.5
PH, URINE: 5 (ref 5–8)
PHOSPHORUS: 4.6 MG/DL (ref 2.5–4.9)
PHOSPHORUS: 7.4 MG/DL (ref 2.5–4.9)
PLATELET # BLD: 101 K/CU MM (ref 140–440)
PLATELET # BLD: 117 K/CU MM (ref 140–440)
PLATELET # BLD: 119 K/CU MM (ref 140–440)
PLATELET # BLD: 121 K/CU MM (ref 140–440)
PLATELET # BLD: 123 K/CU MM (ref 140–440)
PLATELET # BLD: 124 K/CU MM (ref 140–440)
PLATELET # BLD: 130 K/CU MM (ref 140–440)
PLATELET # BLD: 149 K/CU MM (ref 140–440)
PLATELET # BLD: 157 K/CU MM (ref 140–440)
PLATELET # BLD: 163 K/ΜL
PLATELET # BLD: ABNORMAL K/CU MM (ref 140–440)
PMV BLD AUTO: 11.3 FL (ref 7.5–11.1)
PMV BLD AUTO: 11.5 FL (ref 7.5–11.1)
PMV BLD AUTO: 11.7 FL (ref 7.5–11.1)
PMV BLD AUTO: 12 FL (ref 7.5–11.1)
PMV BLD AUTO: 12.2 FL (ref 7.5–11.1)
PMV BLD AUTO: 12.4 FL (ref 7.5–11.1)
PMV BLD AUTO: 12.6 FL (ref 7.5–11.1)
PMV BLD AUTO: 12.8 FL (ref 7.5–11.1)
PMV BLD AUTO: 13.3 FL (ref 7.5–11.1)
PMV BLD AUTO: 13.5 FL (ref 7.5–11.1)
PMV BLD AUTO: NORMAL FL
POTASSIUM SERPL-SCNC: 3.2 MMOL/L (ref 3.5–5.1)
POTASSIUM SERPL-SCNC: 3.3 MMOL/L (ref 3.5–5.1)
POTASSIUM SERPL-SCNC: 3.4 MMOL/L (ref 3.5–5.1)
POTASSIUM SERPL-SCNC: 3.5 MMOL/L (ref 3.5–5.1)
POTASSIUM SERPL-SCNC: 3.5 MMOL/L (ref 3.5–5.1)
POTASSIUM SERPL-SCNC: 3.7 MMOL/L (ref 3.5–5.1)
POTASSIUM SERPL-SCNC: 3.7 MMOL/L (ref 3.5–5.1)
POTASSIUM SERPL-SCNC: 3.8 MMOL/L (ref 3.5–5.1)
POTASSIUM SERPL-SCNC: 3.9 MMOL/L (ref 3.5–5.1)
POTASSIUM SERPL-SCNC: 3.9 MMOL/L (ref 3.5–5.1)
POTASSIUM SERPL-SCNC: 4 MMOL/L (ref 3.5–5.1)
POTASSIUM SERPL-SCNC: 4.1 MMOL/L (ref 3.5–5.1)
POTASSIUM SERPL-SCNC: 4.1 MMOL/L (ref 3.5–5.1)
POTASSIUM SERPL-SCNC: 4.2 MMOL/L (ref 3.5–5.1)
POTASSIUM SERPL-SCNC: 4.3 MMOL/L (ref 3.5–5.1)
POTASSIUM SERPL-SCNC: 4.4 MMOL/L
POTASSIUM SERPL-SCNC: 4.5 MMOL/L (ref 3.5–5.1)
POTASSIUM SERPL-SCNC: ABNORMAL MMOL/L (ref 3.5–5.1)
POTASSIUM, UR: 18.8 MMOL/L (ref 22–119)
PRELIMINARY: NORMAL
PRO-BNP: 6617 PG/ML
PRO-BNP: ABNORMAL PG/ML
PRO-BNP: ABNORMAL PG/ML
PROTEIN FLUID: 1.2 GM/DL
PROTEIN FLUID: 1.6 GM/DL
PROTEIN UA: NEGATIVE MG/DL
PROTHROMBIN TIME: 12.2 SECONDS (ref 9.12–12.5)
PROTHROMBIN TIME: 12.4 SECONDS (ref 9.12–12.5)
PROTHROMBIN TIME: 12.7 SECONDS (ref 9.12–12.5)
RBC # BLD: 3.98 M/CU MM (ref 4.6–6.2)
RBC # BLD: 3.99 M/CU MM (ref 4.6–6.2)
RBC # BLD: 3.99 M/CU MM (ref 4.6–6.2)
RBC # BLD: 4.08 M/CU MM (ref 4.6–6.2)
RBC # BLD: 4.11 M/CU MM (ref 4.6–6.2)
RBC # BLD: 4.24 M/CU MM (ref 4.6–6.2)
RBC # BLD: 4.25 M/CU MM (ref 4.6–6.2)
RBC # BLD: 4.25 M/CU MM (ref 4.6–6.2)
RBC # BLD: 4.26 M/CU MM (ref 4.6–6.2)
RBC # BLD: 4.26 M/CU MM (ref 4.6–6.2)
RBC # BLD: 4.75 10^6/ΜL
RBC FLUID: 2000 /CU MM
RBC FLUID: NORMAL /CU MM
RBC URINE: 1 /HPF (ref 0–3)
REASON FOR REJECTION: NORMAL
REASON FOR REJECTION: NORMAL
REJECTED TEST: NORMAL
SEGMENTED NEUTROPHILS ABSOLUTE COUNT: 5.3 K/CU MM
SEGMENTED NEUTROPHILS ABSOLUTE COUNT: 6 K/CU MM
SEGMENTED NEUTROPHILS ABSOLUTE COUNT: 6.2 K/CU MM
SEGMENTED NEUTROPHILS ABSOLUTE COUNT: 6.7 K/CU MM
SEGMENTED NEUTROPHILS ABSOLUTE COUNT: 8.1 K/CU MM
SEGMENTED NEUTROPHILS RELATIVE PERCENT: 66.4 % (ref 36–66)
SEGMENTED NEUTROPHILS RELATIVE PERCENT: 71 % (ref 36–66)
SEGMENTED NEUTROPHILS RELATIVE PERCENT: 74.4 % (ref 36–66)
SEGMENTED NEUTROPHILS RELATIVE PERCENT: 77 % (ref 36–66)
SEGMENTED NEUTROPHILS RELATIVE PERCENT: 81.6 % (ref 36–66)
SODIUM BLD-SCNC: 129 MMOL/L (ref 135–145)
SODIUM BLD-SCNC: 131 MMOL/L (ref 135–145)
SODIUM BLD-SCNC: 132 MMOL/L (ref 135–145)
SODIUM BLD-SCNC: 132 MMOL/L (ref 135–145)
SODIUM BLD-SCNC: 133 MMOL/L (ref 135–145)
SODIUM BLD-SCNC: 133 MMOL/L (ref 135–145)
SODIUM BLD-SCNC: 134 MMOL/L (ref 135–145)
SODIUM BLD-SCNC: 134 MMOL/L (ref 135–145)
SODIUM BLD-SCNC: 135 MMOL/L (ref 135–145)
SODIUM BLD-SCNC: 135 MMOL/L (ref 135–145)
SODIUM BLD-SCNC: 136 MMOL/L (ref 135–145)
SODIUM BLD-SCNC: 137 MMOL/L (ref 135–145)
SODIUM BLD-SCNC: 138 MMOL/L (ref 135–145)
SODIUM BLD-SCNC: 138 MMOL/L (ref 135–145)
SODIUM BLD-SCNC: 140 MMOL/L (ref 135–145)
SODIUM BLD-SCNC: 142 MMOL/L
SODIUM URINE: 62 MMOL/L (ref 35–167)
SPECIFIC GRAVITY UA: 1.01 (ref 1–1.03)
SPECIMEN: ABNORMAL
SPECIMEN: ABNORMAL
SQUAMOUS EPITHELIAL: <1 /HPF
TOTAL IMMATURE NEUTOROPHIL: 0.02 K/CU MM
TOTAL IMMATURE NEUTOROPHIL: 0.02 K/CU MM
TOTAL IMMATURE NEUTOROPHIL: 0.03 K/CU MM
TOTAL IMMATURE NEUTOROPHIL: 0.03 K/CU MM
TOTAL IMMATURE NEUTOROPHIL: 0.04 K/CU MM
TOTAL NUCLEATED RBC: 0 K/CU MM
TOTAL PROTEIN: 5.8 GM/DL (ref 6.4–8.2)
TOTAL PROTEIN: 5.8 GM/DL (ref 6.4–8.2)
TOTAL PROTEIN: 5.9 GM/DL (ref 6.4–8.2)
TOTAL PROTEIN: 6.7 GM/DL (ref 6.4–8.2)
TRICHOMONAS: ABNORMAL /HPF
TRIGL SERPL-MCNC: 56 MG/DL
TRIGL SERPL-MCNC: 60 MG/DL
TROPONIN T: 0.06 NG/ML
TROPONIN T: 0.06 NG/ML
TROPONIN T: 0.07 NG/ML
TROPONIN T: 0.33 NG/ML
TROPONIN T: 0.34 NG/ML
TROPONIN T: 0.35 NG/ML
TSH HIGH SENSITIVITY: 1.47 UIU/ML (ref 0.27–4.2)
TSH HIGH SENSITIVITY: 2.92 UIU/ML (ref 0.27–4.2)
TSH HIGH SENSITIVITY: 3.17 UIU/ML (ref 0.27–4.2)
UROBILINOGEN, URINE: NORMAL MG/DL (ref 0.2–1)
WBC # BLD: 5.6 K/CU MM (ref 4–10.5)
WBC # BLD: 6.9 K/CU MM (ref 4–10.5)
WBC # BLD: 7.24 10^3/ML
WBC # BLD: 7.4 K/CU MM (ref 4–10.5)
WBC # BLD: 7.8 K/CU MM (ref 4–10.5)
WBC # BLD: 7.9 K/CU MM (ref 4–10.5)
WBC # BLD: 8 K/CU MM (ref 4–10.5)
WBC # BLD: 8.7 K/CU MM (ref 4–10.5)
WBC # BLD: 9 K/CU MM (ref 4–10.5)
WBC # BLD: 9.7 K/CU MM (ref 4–10.5)
WBC # BLD: 9.9 K/CU MM (ref 4–10.5)
WBC FLUID: 368 /CU MM
WBC FLUID: 597 /CU MM
WBC UA: 1 /HPF (ref 0–2)

## 2019-01-01 PROCEDURE — 80053 COMPREHEN METABOLIC PANEL: CPT

## 2019-01-01 PROCEDURE — 84443 ASSAY THYROID STIM HORMONE: CPT

## 2019-01-01 PROCEDURE — 85730 THROMBOPLASTIN TIME PARTIAL: CPT

## 2019-01-01 PROCEDURE — 89051 BODY FLUID CELL COUNT: CPT

## 2019-01-01 PROCEDURE — 93000 ELECTROCARDIOGRAM COMPLETE: CPT | Performed by: INTERNAL MEDICINE

## 2019-01-01 PROCEDURE — 83880 ASSAY OF NATRIURETIC PEPTIDE: CPT

## 2019-01-01 PROCEDURE — 2580000003 HC RX 258: Performed by: INTERNAL MEDICINE

## 2019-01-01 PROCEDURE — 82962 GLUCOSE BLOOD TEST: CPT

## 2019-01-01 PROCEDURE — 87116 MYCOBACTERIA CULTURE: CPT

## 2019-01-01 PROCEDURE — 6360000002 HC RX W HCPCS: Performed by: INTERNAL MEDICINE

## 2019-01-01 PROCEDURE — 2140000000 HC CCU INTERMEDIATE R&B

## 2019-01-01 PROCEDURE — 85025 COMPLETE CBC W/AUTO DIFF WBC: CPT

## 2019-01-01 PROCEDURE — 6370000000 HC RX 637 (ALT 250 FOR IP): Performed by: HOSPITALIST

## 2019-01-01 PROCEDURE — 6370000000 HC RX 637 (ALT 250 FOR IP): Performed by: INTERNAL MEDICINE

## 2019-01-01 PROCEDURE — 93005 ELECTROCARDIOGRAM TRACING: CPT | Performed by: NURSE PRACTITIONER

## 2019-01-01 PROCEDURE — 99223 1ST HOSP IP/OBS HIGH 75: CPT | Performed by: INTERNAL MEDICINE

## 2019-01-01 PROCEDURE — 80048 BASIC METABOLIC PNL TOTAL CA: CPT

## 2019-01-01 PROCEDURE — 99231 SBSQ HOSP IP/OBS SF/LOW 25: CPT | Performed by: NURSE PRACTITIONER

## 2019-01-01 PROCEDURE — 84484 ASSAY OF TROPONIN QUANT: CPT

## 2019-01-01 PROCEDURE — 84300 ASSAY OF URINE SODIUM: CPT

## 2019-01-01 PROCEDURE — 87071 CULTURE AEROBIC QUANT OTHER: CPT

## 2019-01-01 PROCEDURE — 93295 DEV INTERROG REMOTE 1/2/MLT: CPT | Performed by: INTERNAL MEDICINE

## 2019-01-01 PROCEDURE — 94761 N-INVAS EAR/PLS OXIMETRY MLT: CPT

## 2019-01-01 PROCEDURE — 99222 1ST HOSP IP/OBS MODERATE 55: CPT | Performed by: INTERNAL MEDICINE

## 2019-01-01 PROCEDURE — 36415 COLL VENOUS BLD VENIPUNCTURE: CPT

## 2019-01-01 PROCEDURE — 93290 INTERROG DEV EVAL ICPMS IP: CPT | Performed by: INTERNAL MEDICINE

## 2019-01-01 PROCEDURE — 92950 HEART/LUNG RESUSCITATION CPR: CPT

## 2019-01-01 PROCEDURE — 93283 PRGRMG EVAL IMPLANTABLE DFB: CPT | Performed by: INTERNAL MEDICINE

## 2019-01-01 PROCEDURE — 86901 BLOOD TYPING SEROLOGIC RH(D): CPT

## 2019-01-01 PROCEDURE — 2580000003 HC RX 258: Performed by: NURSE PRACTITIONER

## 2019-01-01 PROCEDURE — 99285 EMERGENCY DEPT VISIT HI MDM: CPT

## 2019-01-01 PROCEDURE — 76937 US GUIDE VASCULAR ACCESS: CPT

## 2019-01-01 PROCEDURE — 99213 OFFICE O/P EST LOW 20 MIN: CPT

## 2019-01-01 PROCEDURE — 84100 ASSAY OF PHOSPHORUS: CPT

## 2019-01-01 PROCEDURE — 6360000002 HC RX W HCPCS: Performed by: EMERGENCY MEDICINE

## 2019-01-01 PROCEDURE — 83735 ASSAY OF MAGNESIUM: CPT

## 2019-01-01 PROCEDURE — 2500000003 HC RX 250 WO HCPCS: Performed by: INTERNAL MEDICINE

## 2019-01-01 PROCEDURE — APPNB45 APP NON BILLABLE 31-45 MINUTES: Performed by: NURSE PRACTITIONER

## 2019-01-01 PROCEDURE — 83986 ASSAY PH BODY FLUID NOS: CPT

## 2019-01-01 PROCEDURE — 87102 FUNGUS ISOLATION CULTURE: CPT

## 2019-01-01 PROCEDURE — 86704 HEP B CORE ANTIBODY TOTAL: CPT

## 2019-01-01 PROCEDURE — 99233 SBSQ HOSP IP/OBS HIGH 50: CPT | Performed by: INTERNAL MEDICINE

## 2019-01-01 PROCEDURE — 2580000003 HC RX 258: Performed by: HOSPITALIST

## 2019-01-01 PROCEDURE — C1894 INTRO/SHEATH, NON-LASER: HCPCS

## 2019-01-01 PROCEDURE — 85610 PROTHROMBIN TIME: CPT

## 2019-01-01 PROCEDURE — 84157 ASSAY OF PROTEIN OTHER: CPT

## 2019-01-01 PROCEDURE — C1751 CATH, INF, PER/CENT/MIDLINE: HCPCS

## 2019-01-01 PROCEDURE — 85027 COMPLETE CBC AUTOMATED: CPT

## 2019-01-01 PROCEDURE — 90935 HEMODIALYSIS ONE EVALUATION: CPT

## 2019-01-01 PROCEDURE — 93010 ELECTROCARDIOGRAM REPORT: CPT | Performed by: INTERNAL MEDICINE

## 2019-01-01 PROCEDURE — 6360000002 HC RX W HCPCS: Performed by: HOSPITALIST

## 2019-01-01 PROCEDURE — 99214 OFFICE O/P EST MOD 30 MIN: CPT | Performed by: INTERNAL MEDICINE

## 2019-01-01 PROCEDURE — 6360000002 HC RX W HCPCS: Performed by: NURSE PRACTITIONER

## 2019-01-01 PROCEDURE — 80061 LIPID PANEL: CPT

## 2019-01-01 PROCEDURE — 6370000000 HC RX 637 (ALT 250 FOR IP): Performed by: NURSE PRACTITIONER

## 2019-01-01 PROCEDURE — 0W993ZZ DRAINAGE OF RIGHT PLEURAL CAVITY, PERCUTANEOUS APPROACH: ICD-10-PCS | Performed by: RADIOLOGY

## 2019-01-01 PROCEDURE — 32555 ASPIRATE PLEURA W/ IMAGING: CPT

## 2019-01-01 PROCEDURE — 36558 INSERT TUNNELED CV CATH: CPT

## 2019-01-01 PROCEDURE — 99232 SBSQ HOSP IP/OBS MODERATE 35: CPT | Performed by: INTERNAL MEDICINE

## 2019-01-01 PROCEDURE — 81001 URINALYSIS AUTO W/SCOPE: CPT

## 2019-01-01 PROCEDURE — 84132 ASSAY OF SERUM POTASSIUM: CPT

## 2019-01-01 PROCEDURE — 88108 CYTOPATH CONCENTRATE TECH: CPT

## 2019-01-01 PROCEDURE — 99221 1ST HOSP IP/OBS SF/LOW 40: CPT | Performed by: INTERNAL MEDICINE

## 2019-01-01 PROCEDURE — 93005 ELECTROCARDIOGRAM TRACING: CPT | Performed by: EMERGENCY MEDICINE

## 2019-01-01 PROCEDURE — 71045 X-RAY EXAM CHEST 1 VIEW: CPT

## 2019-01-01 PROCEDURE — 96374 THER/PROPH/DIAG INJ IV PUSH: CPT

## 2019-01-01 PROCEDURE — 93306 TTE W/DOPPLER COMPLETE: CPT

## 2019-01-01 PROCEDURE — 0W9B3ZZ DRAINAGE OF LEFT PLEURAL CAVITY, PERCUTANEOUS APPROACH: ICD-10-PCS | Performed by: RADIOLOGY

## 2019-01-01 PROCEDURE — 86900 BLOOD TYPING SEROLOGIC ABO: CPT

## 2019-01-01 PROCEDURE — 93017 CV STRESS TEST TRACING ONLY: CPT | Performed by: INTERNAL MEDICINE

## 2019-01-01 PROCEDURE — 82436 ASSAY OF URINE CHLORIDE: CPT

## 2019-01-01 PROCEDURE — 2709999900 HC NON-CHARGEABLE SUPPLY

## 2019-01-01 PROCEDURE — 87205 SMEAR GRAM STAIN: CPT

## 2019-01-01 PROCEDURE — 82945 GLUCOSE OTHER FLUID: CPT

## 2019-01-01 PROCEDURE — 93922 UPR/L XTREMITY ART 2 LEVELS: CPT | Performed by: INTERNAL MEDICINE

## 2019-01-01 PROCEDURE — 87073 CULTURE BACTERIA ANAEROBIC: CPT

## 2019-01-01 PROCEDURE — 84133 ASSAY OF URINE POTASSIUM: CPT

## 2019-01-01 PROCEDURE — 99232 SBSQ HOSP IP/OBS MODERATE 35: CPT | Performed by: NURSE PRACTITIONER

## 2019-01-01 PROCEDURE — 2500000003 HC RX 250 WO HCPCS: Performed by: EMERGENCY MEDICINE

## 2019-01-01 PROCEDURE — 5A1D70Z PERFORMANCE OF URINARY FILTRATION, INTERMITTENT, LESS THAN 6 HOURS PER DAY: ICD-10-PCS | Performed by: INTERNAL MEDICINE

## 2019-01-01 PROCEDURE — 83721 ASSAY OF BLOOD LIPOPROTEIN: CPT

## 2019-01-01 PROCEDURE — 1200000000 HC SEMI PRIVATE

## 2019-01-01 PROCEDURE — 99213 OFFICE O/P EST LOW 20 MIN: CPT | Performed by: NURSE PRACTITIONER

## 2019-01-01 PROCEDURE — G0378 HOSPITAL OBSERVATION PER HR: HCPCS

## 2019-01-01 PROCEDURE — 93005 ELECTROCARDIOGRAM TRACING: CPT | Performed by: ANESTHESIOLOGY

## 2019-01-01 PROCEDURE — C1729 CATH, DRAINAGE: HCPCS

## 2019-01-01 PROCEDURE — 99212 OFFICE O/P EST SF 10 MIN: CPT | Performed by: NURSE PRACTITIONER

## 2019-01-01 PROCEDURE — 99203 OFFICE O/P NEW LOW 30 MIN: CPT | Performed by: NURSE PRACTITIONER

## 2019-01-01 PROCEDURE — 87340 HEPATITIS B SURFACE AG IA: CPT

## 2019-01-01 PROCEDURE — 77001 FLUOROGUIDE FOR VEIN DEVICE: CPT

## 2019-01-01 PROCEDURE — 96375 TX/PRO/DX INJ NEW DRUG ADDON: CPT

## 2019-01-01 PROCEDURE — 93296 REM INTERROG EVL PM/IDS: CPT | Performed by: INTERNAL MEDICINE

## 2019-01-01 PROCEDURE — 86850 RBC ANTIBODY SCREEN: CPT

## 2019-01-01 PROCEDURE — 0JPT0PZ REMOVAL OF CARDIAC RHYTHM RELATED DEVICE FROM TRUNK SUBCUTANEOUS TISSUE AND FASCIA, OPEN APPROACH: ICD-10-PCS | Performed by: INTERNAL MEDICINE

## 2019-01-01 PROCEDURE — 93005 ELECTROCARDIOGRAM TRACING: CPT | Performed by: PHYSICIAN ASSISTANT

## 2019-01-01 PROCEDURE — 6360000002 HC RX W HCPCS: Performed by: PHYSICIAN ASSISTANT

## 2019-01-01 PROCEDURE — 0JH608Z INSERTION OF DEFIBRILLATOR GENERATOR INTO CHEST SUBCUTANEOUS TISSUE AND FASCIA, OPEN APPROACH: ICD-10-PCS | Performed by: INTERNAL MEDICINE

## 2019-01-01 PROCEDURE — 6360000002 HC RX W HCPCS: Performed by: RADIOLOGY

## 2019-01-01 PROCEDURE — 71046 X-RAY EXAM CHEST 2 VIEWS: CPT

## 2019-01-01 PROCEDURE — G0379 DIRECT REFER HOSPITAL OBSERV: HCPCS

## 2019-01-01 PROCEDURE — 93016 CV STRESS TEST SUPVJ ONLY: CPT | Performed by: INTERNAL MEDICINE

## 2019-01-01 PROCEDURE — 80069 RENAL FUNCTION PANEL: CPT

## 2019-01-01 PROCEDURE — 93005 ELECTROCARDIOGRAM TRACING: CPT | Performed by: THORACIC SURGERY (CARDIOTHORACIC VASCULAR SURGERY)

## 2019-01-01 PROCEDURE — 93005 ELECTROCARDIOGRAM TRACING: CPT | Performed by: INTERNAL MEDICINE

## 2019-01-01 PROCEDURE — 86706 HEP B SURFACE ANTIBODY: CPT

## 2019-01-01 PROCEDURE — 83615 LACTATE (LD) (LDH) ENZYME: CPT

## 2019-01-01 PROCEDURE — 78452 HT MUSCLE IMAGE SPECT MULT: CPT | Performed by: INTERNAL MEDICINE

## 2019-01-01 PROCEDURE — 2580000003 HC RX 258

## 2019-01-01 PROCEDURE — 2700000000 HC OXYGEN THERAPY PER DAY

## 2019-01-01 PROCEDURE — 88305 TISSUE EXAM BY PATHOLOGIST: CPT

## 2019-01-01 PROCEDURE — A9500 TC99M SESTAMIBI: HCPCS | Performed by: INTERNAL MEDICINE

## 2019-01-01 PROCEDURE — 2580000003 HC RX 258: Performed by: EMERGENCY MEDICINE

## 2019-01-01 PROCEDURE — 83036 HEMOGLOBIN GLYCOSYLATED A1C: CPT

## 2019-01-01 PROCEDURE — 99024 POSTOP FOLLOW-UP VISIT: CPT | Performed by: INTERNAL MEDICINE

## 2019-01-01 PROCEDURE — 99204 OFFICE O/P NEW MOD 45 MIN: CPT | Performed by: INTERNAL MEDICINE

## 2019-01-01 PROCEDURE — 76775 US EXAM ABDO BACK WALL LIM: CPT

## 2019-01-01 PROCEDURE — 74018 RADEX ABDOMEN 1 VIEW: CPT

## 2019-01-01 PROCEDURE — 93925 LOWER EXTREMITY STUDY: CPT | Performed by: INTERNAL MEDICINE

## 2019-01-01 PROCEDURE — 93018 CV STRESS TEST I&R ONLY: CPT | Performed by: INTERNAL MEDICINE

## 2019-01-01 PROCEDURE — 02HV33Z INSERTION OF INFUSION DEVICE INTO SUPERIOR VENA CAVA, PERCUTANEOUS APPROACH: ICD-10-PCS | Performed by: RADIOLOGY

## 2019-01-01 RX ORDER — INSULIN GLARGINE 100 [IU]/ML
24 INJECTION, SOLUTION SUBCUTANEOUS NIGHTLY
Status: DISCONTINUED | OUTPATIENT
Start: 2019-01-01 | End: 2019-01-01

## 2019-01-01 RX ORDER — FUROSEMIDE 10 MG/ML
40 INJECTION INTRAMUSCULAR; INTRAVENOUS 2 TIMES DAILY
Status: DISCONTINUED | OUTPATIENT
Start: 2019-01-01 | End: 2019-01-01 | Stop reason: HOSPADM

## 2019-01-01 RX ORDER — NICOTINE POLACRILEX 4 MG
15 LOZENGE BUCCAL PRN
Status: DISCONTINUED | OUTPATIENT
Start: 2019-01-01 | End: 2019-01-01 | Stop reason: HOSPADM

## 2019-01-01 RX ORDER — DEXTROSE MONOHYDRATE 25 G/50ML
12.5 INJECTION, SOLUTION INTRAVENOUS PRN
Status: DISCONTINUED | OUTPATIENT
Start: 2019-01-01 | End: 2019-01-01 | Stop reason: HOSPADM

## 2019-01-01 RX ORDER — BUMETANIDE 0.25 MG/ML
2 INJECTION, SOLUTION INTRAMUSCULAR; INTRAVENOUS ONCE
Status: COMPLETED | OUTPATIENT
Start: 2019-01-01 | End: 2019-01-01

## 2019-01-01 RX ORDER — CALCITRIOL 0.25 UG/1
0.25 CAPSULE, LIQUID FILLED ORAL DAILY
Status: DISCONTINUED | OUTPATIENT
Start: 2019-01-01 | End: 2019-01-01

## 2019-01-01 RX ORDER — FUROSEMIDE 20 MG/1
20 TABLET ORAL DAILY
Status: ON HOLD | COMMUNITY
End: 2019-01-01 | Stop reason: SDUPTHER

## 2019-01-01 RX ORDER — BLOOD-GLUCOSE SENSOR
EACH MISCELLANEOUS
Refills: 4 | COMMUNITY
Start: 2019-01-01

## 2019-01-01 RX ORDER — PRAVASTATIN SODIUM 10 MG
10 TABLET ORAL DAILY
COMMUNITY

## 2019-01-01 RX ORDER — PRAVASTATIN SODIUM 10 MG
10 TABLET ORAL DAILY
Status: DISCONTINUED | OUTPATIENT
Start: 2019-01-01 | End: 2019-01-01

## 2019-01-01 RX ORDER — CALCITRIOL 0.25 UG/1
0.25 CAPSULE, LIQUID FILLED ORAL NIGHTLY
Status: DISCONTINUED | OUTPATIENT
Start: 2019-01-01 | End: 2019-01-01 | Stop reason: HOSPADM

## 2019-01-01 RX ORDER — ISOSORBIDE MONONITRATE 30 MG/1
30 TABLET, EXTENDED RELEASE ORAL DAILY
Status: DISCONTINUED | OUTPATIENT
Start: 2019-01-01 | End: 2019-01-01 | Stop reason: HOSPADM

## 2019-01-01 RX ORDER — METOPROLOL SUCCINATE 25 MG/1
50 TABLET, EXTENDED RELEASE ORAL DAILY
Qty: 30 TABLET | Refills: 5 | Status: SHIPPED
Start: 2019-01-01 | End: 2019-01-01 | Stop reason: SDUPTHER

## 2019-01-01 RX ORDER — FENTANYL CITRATE 50 UG/ML
25 INJECTION, SOLUTION INTRAMUSCULAR; INTRAVENOUS ONCE
Status: COMPLETED | OUTPATIENT
Start: 2019-01-01 | End: 2019-01-01

## 2019-01-01 RX ORDER — ISOSORBIDE MONONITRATE 30 MG/1
30 TABLET, EXTENDED RELEASE ORAL DAILY
Status: DISCONTINUED | OUTPATIENT
Start: 2019-01-01 | End: 2019-01-01

## 2019-01-01 RX ORDER — CARVEDILOL 6.25 MG/1
6.25 TABLET ORAL 2 TIMES DAILY WITH MEALS
Qty: 60 TABLET | Refills: 0 | Status: SHIPPED | OUTPATIENT
Start: 2019-01-01 | End: 2019-01-01

## 2019-01-01 RX ORDER — ASPIRIN 81 MG/1
81 TABLET, CHEWABLE ORAL DAILY
Status: DISCONTINUED | OUTPATIENT
Start: 2019-01-01 | End: 2019-01-01 | Stop reason: HOSPADM

## 2019-01-01 RX ORDER — FUROSEMIDE 10 MG/ML
40 INJECTION INTRAMUSCULAR; INTRAVENOUS ONCE
Status: COMPLETED | OUTPATIENT
Start: 2019-01-01 | End: 2019-01-01

## 2019-01-01 RX ORDER — CARVEDILOL 6.25 MG/1
6.25 TABLET ORAL 2 TIMES DAILY WITH MEALS
Status: DISCONTINUED | OUTPATIENT
Start: 2019-01-01 | End: 2019-01-01

## 2019-01-01 RX ORDER — PRAVASTATIN SODIUM 10 MG
10 TABLET ORAL NIGHTLY
Status: DISCONTINUED | OUTPATIENT
Start: 2019-01-01 | End: 2019-01-01 | Stop reason: HOSPADM

## 2019-01-01 RX ORDER — FUROSEMIDE 40 MG/1
40 TABLET ORAL 2 TIMES DAILY
Status: DISCONTINUED | OUTPATIENT
Start: 2019-01-01 | End: 2019-01-01

## 2019-01-01 RX ORDER — FUROSEMIDE 40 MG/1
80 TABLET ORAL 2 TIMES DAILY
Status: DISCONTINUED | OUTPATIENT
Start: 2019-01-01 | End: 2019-01-01 | Stop reason: HOSPADM

## 2019-01-01 RX ORDER — FUROSEMIDE 40 MG/1
80 TABLET ORAL DAILY
Status: DISCONTINUED | OUTPATIENT
Start: 2019-01-01 | End: 2019-01-01

## 2019-01-01 RX ORDER — POTASSIUM CHLORIDE 1.5 G/1.77G
40 POWDER, FOR SOLUTION ORAL PRN
Status: DISCONTINUED | OUTPATIENT
Start: 2019-01-01 | End: 2019-01-01 | Stop reason: HOSPADM

## 2019-01-01 RX ORDER — DOBUTAMINE HYDROCHLORIDE 200 MG/100ML
2.5 INJECTION INTRAVENOUS CONTINUOUS
Status: DISCONTINUED | OUTPATIENT
Start: 2019-01-01 | End: 2019-01-01 | Stop reason: HOSPADM

## 2019-01-01 RX ORDER — FUROSEMIDE 10 MG/ML
20 INJECTION INTRAMUSCULAR; INTRAVENOUS 2 TIMES DAILY
Status: DISCONTINUED | OUTPATIENT
Start: 2019-01-01 | End: 2019-01-01

## 2019-01-01 RX ORDER — SODIUM CHLORIDE 9 MG/ML
INJECTION, SOLUTION INTRAVENOUS
Status: DISCONTINUED
Start: 2019-01-01 | End: 2019-01-01

## 2019-01-01 RX ORDER — AMLODIPINE BESYLATE 2.5 MG/1
2.5 TABLET ORAL DAILY
COMMUNITY
End: 2019-01-01 | Stop reason: DRUGHIGH

## 2019-01-01 RX ORDER — METOLAZONE 5 MG/1
5 TABLET ORAL
Qty: 36 TABLET | Refills: 3 | Status: ON HOLD | OUTPATIENT
Start: 2019-01-01 | End: 2019-01-01 | Stop reason: ALTCHOICE

## 2019-01-01 RX ORDER — DIGOXIN 0.25 MG/ML
250 INJECTION INTRAMUSCULAR; INTRAVENOUS ONCE
Status: COMPLETED | OUTPATIENT
Start: 2019-01-01 | End: 2019-01-01

## 2019-01-01 RX ORDER — FUROSEMIDE 10 MG/ML
40 INJECTION INTRAMUSCULAR; INTRAVENOUS 2 TIMES DAILY
Status: DISCONTINUED | OUTPATIENT
Start: 2019-01-01 | End: 2019-01-01

## 2019-01-01 RX ORDER — SODIUM CHLORIDE 0.9 % (FLUSH) 0.9 %
10 SYRINGE (ML) INJECTION PRN
Status: DISCONTINUED | OUTPATIENT
Start: 2019-01-01 | End: 2019-01-01 | Stop reason: HOSPADM

## 2019-01-01 RX ORDER — METOLAZONE 2.5 MG/1
5 TABLET ORAL
Status: DISCONTINUED | OUTPATIENT
Start: 2019-01-01 | End: 2019-01-01 | Stop reason: HOSPADM

## 2019-01-01 RX ORDER — POTASSIUM CHLORIDE 1.5 G/1.77G
20 POWDER, FOR SOLUTION ORAL EVERY OTHER DAY
COMMUNITY
End: 2019-01-01 | Stop reason: CLARIF

## 2019-01-01 RX ORDER — ACETAMINOPHEN 325 MG/1
650 TABLET ORAL EVERY 4 HOURS PRN
Status: DISCONTINUED | OUTPATIENT
Start: 2019-01-01 | End: 2019-01-01 | Stop reason: HOSPADM

## 2019-01-01 RX ORDER — OXYCODONE HYDROCHLORIDE AND ACETAMINOPHEN 5; 325 MG/1; MG/1
1 TABLET ORAL ONCE
Status: DISCONTINUED | OUTPATIENT
Start: 2019-01-01 | End: 2019-01-01 | Stop reason: HOSPADM

## 2019-01-01 RX ORDER — METOPROLOL SUCCINATE 50 MG/1
50 TABLET, EXTENDED RELEASE ORAL DAILY
COMMUNITY
End: 2019-01-01

## 2019-01-01 RX ORDER — DEXTROSE MONOHYDRATE 50 MG/ML
100 INJECTION, SOLUTION INTRAVENOUS PRN
Status: DISCONTINUED | OUTPATIENT
Start: 2019-01-01 | End: 2019-01-01 | Stop reason: HOSPADM

## 2019-01-01 RX ORDER — POTASSIUM CHLORIDE 7.45 MG/ML
10 INJECTION INTRAVENOUS PRN
Status: DISCONTINUED | OUTPATIENT
Start: 2019-01-01 | End: 2019-01-01 | Stop reason: HOSPADM

## 2019-01-01 RX ORDER — POTASSIUM CHLORIDE 20 MEQ/1
40 TABLET, EXTENDED RELEASE ORAL PRN
Status: DISCONTINUED | OUTPATIENT
Start: 2019-01-01 | End: 2019-01-01 | Stop reason: HOSPADM

## 2019-01-01 RX ORDER — METOPROLOL SUCCINATE 25 MG/1
25 TABLET, EXTENDED RELEASE ORAL 2 TIMES DAILY
Status: DISCONTINUED | OUTPATIENT
Start: 2019-01-01 | End: 2019-01-01 | Stop reason: HOSPADM

## 2019-01-01 RX ORDER — ISOSORBIDE MONONITRATE 30 MG/1
30 TABLET, EXTENDED RELEASE ORAL DAILY
Qty: 90 TABLET | Refills: 3 | Status: SHIPPED | OUTPATIENT
Start: 2019-01-01 | End: 2019-01-01

## 2019-01-01 RX ORDER — FUROSEMIDE 40 MG/1
80 TABLET ORAL DAILY
Status: DISCONTINUED | OUTPATIENT
Start: 2019-01-01 | End: 2019-01-01 | Stop reason: HOSPADM

## 2019-01-01 RX ORDER — DOCUSATE SODIUM 100 MG/1
100 CAPSULE, LIQUID FILLED ORAL DAILY
Status: DISCONTINUED | OUTPATIENT
Start: 2019-01-01 | End: 2019-01-01 | Stop reason: HOSPADM

## 2019-01-01 RX ORDER — HEPARIN SODIUM 1000 [USP'U]/ML
3500 INJECTION, SOLUTION INTRAVENOUS; SUBCUTANEOUS
Status: COMPLETED | OUTPATIENT
Start: 2019-01-01 | End: 2019-01-01

## 2019-01-01 RX ORDER — SODIUM CHLORIDE 9 MG/ML
INJECTION, SOLUTION INTRAVENOUS CONTINUOUS
Status: DISCONTINUED | OUTPATIENT
Start: 2019-01-01 | End: 2019-01-01

## 2019-01-01 RX ORDER — HEPARIN SODIUM 5000 [USP'U]/ML
5000 INJECTION, SOLUTION INTRAVENOUS; SUBCUTANEOUS EVERY 8 HOURS SCHEDULED
Status: DISCONTINUED | OUTPATIENT
Start: 2019-01-01 | End: 2019-01-01 | Stop reason: HOSPADM

## 2019-01-01 RX ORDER — POLYETHYLENE GLYCOL 3350 17 G/17G
17 POWDER, FOR SOLUTION ORAL DAILY PRN
Status: DISCONTINUED | OUTPATIENT
Start: 2019-01-01 | End: 2019-01-01 | Stop reason: HOSPADM

## 2019-01-01 RX ORDER — CALCITRIOL 0.25 UG/1
0.25 CAPSULE, LIQUID FILLED ORAL DAILY
Status: DISCONTINUED | OUTPATIENT
Start: 2019-01-01 | End: 2019-01-01 | Stop reason: HOSPADM

## 2019-01-01 RX ORDER — METOPROLOL SUCCINATE 25 MG/1
25 TABLET, EXTENDED RELEASE ORAL DAILY
Qty: 30 TABLET | Refills: 0 | Status: SHIPPED | OUTPATIENT
Start: 2019-01-01 | End: 2019-01-01 | Stop reason: SDUPTHER

## 2019-01-01 RX ORDER — POLYETHYLENE GLYCOL 3350 17 G/17G
17 POWDER, FOR SOLUTION ORAL DAILY PRN
Qty: 527 G | Refills: 0
Start: 2019-01-01 | End: 2019-01-01

## 2019-01-01 RX ORDER — OMEGA-3S/DHA/EPA/FISH OIL/D3 300MG-1000
400 CAPSULE ORAL DAILY
Status: DISCONTINUED | OUTPATIENT
Start: 2019-01-01 | End: 2019-01-01 | Stop reason: HOSPADM

## 2019-01-01 RX ORDER — INSULIN GLARGINE 100 [IU]/ML
16 INJECTION, SOLUTION SUBCUTANEOUS NIGHTLY
Status: DISCONTINUED | OUTPATIENT
Start: 2019-01-01 | End: 2019-01-01 | Stop reason: SDUPTHER

## 2019-01-01 RX ORDER — ASPIRIN 81 MG/1
81 TABLET, CHEWABLE ORAL DAILY
Status: DISCONTINUED | OUTPATIENT
Start: 2019-01-01 | End: 2019-01-01

## 2019-01-01 RX ORDER — ALLOPURINOL 100 MG/1
200 TABLET ORAL DAILY
Status: DISCONTINUED | OUTPATIENT
Start: 2019-01-01 | End: 2019-01-01

## 2019-01-01 RX ORDER — ALLOPURINOL 100 MG/1
200 TABLET ORAL DAILY
Status: DISCONTINUED | OUTPATIENT
Start: 2019-01-01 | End: 2019-01-01 | Stop reason: HOSPADM

## 2019-01-01 RX ORDER — ONDANSETRON 2 MG/ML
4 INJECTION INTRAMUSCULAR; INTRAVENOUS EVERY 6 HOURS PRN
Status: DISCONTINUED | OUTPATIENT
Start: 2019-01-01 | End: 2019-01-01 | Stop reason: HOSPADM

## 2019-01-01 RX ORDER — SODIUM CHLORIDE 0.9 % (FLUSH) 0.9 %
10 SYRINGE (ML) INJECTION EVERY 12 HOURS SCHEDULED
Status: DISCONTINUED | OUTPATIENT
Start: 2019-01-01 | End: 2019-01-01 | Stop reason: HOSPADM

## 2019-01-01 RX ORDER — CALCITRIOL 0.25 UG/1
0.25 CAPSULE, LIQUID FILLED ORAL DAILY
COMMUNITY

## 2019-01-01 RX ORDER — MIDODRINE HYDROCHLORIDE 5 MG/1
10 TABLET ORAL ONCE
Status: COMPLETED | OUTPATIENT
Start: 2019-01-01 | End: 2019-01-01

## 2019-01-01 RX ORDER — POLYETHYLENE GLYCOL 3350 17 G/17G
17 POWDER, FOR SOLUTION ORAL DAILY
Qty: 527 G | Refills: 0 | Status: SHIPPED | OUTPATIENT
Start: 2019-01-01 | End: 2019-01-01

## 2019-01-01 RX ORDER — PRAVASTATIN SODIUM 10 MG
10 TABLET ORAL DAILY
Status: DISCONTINUED | OUTPATIENT
Start: 2019-01-01 | End: 2019-01-01 | Stop reason: HOSPADM

## 2019-01-01 RX ORDER — BUMETANIDE 0.25 MG/ML
2 INJECTION, SOLUTION INTRAMUSCULAR; INTRAVENOUS EVERY 8 HOURS SCHEDULED
Status: DISCONTINUED | OUTPATIENT
Start: 2019-01-01 | End: 2019-01-01

## 2019-01-01 RX ORDER — CYCLOBENZAPRINE HCL 5 MG
TABLET ORAL PRN
Refills: 1 | COMMUNITY
Start: 2019-01-01

## 2019-01-01 RX ORDER — VITAMIN E 268 MG
400 CAPSULE ORAL DAILY
Status: DISCONTINUED | OUTPATIENT
Start: 2019-01-01 | End: 2019-01-01 | Stop reason: HOSPADM

## 2019-01-01 RX ORDER — FAMOTIDINE 20 MG/1
20 TABLET, FILM COATED ORAL DAILY
Status: DISCONTINUED | OUTPATIENT
Start: 2019-01-01 | End: 2019-01-01 | Stop reason: HOSPADM

## 2019-01-01 RX ORDER — CARVEDILOL 6.25 MG/1
6.25 TABLET ORAL 2 TIMES DAILY WITH MEALS
Status: DISCONTINUED | OUTPATIENT
Start: 2019-01-01 | End: 2019-01-01 | Stop reason: HOSPADM

## 2019-01-01 RX ORDER — AMLODIPINE BESYLATE 2.5 MG/1
5 TABLET ORAL DAILY
Qty: 30 TABLET | Refills: 0 | Status: ON HOLD
Start: 2019-01-01 | End: 2019-01-01 | Stop reason: HOSPADM

## 2019-01-01 RX ORDER — CEPHALEXIN 500 MG/1
500 CAPSULE ORAL 2 TIMES DAILY
COMMUNITY
End: 2019-01-01

## 2019-01-01 RX ORDER — METOPROLOL SUCCINATE 25 MG/1
25 TABLET, EXTENDED RELEASE ORAL 2 TIMES DAILY
Qty: 180 TABLET | Refills: 3 | Status: SHIPPED | OUTPATIENT
Start: 2019-01-01 | End: 2019-01-01

## 2019-01-01 RX ORDER — FUROSEMIDE 40 MG/1
40 TABLET ORAL 2 TIMES DAILY
Qty: 60 TABLET | Refills: 3 | Status: ON HOLD | OUTPATIENT
Start: 2019-01-01 | End: 2019-01-01 | Stop reason: HOSPADM

## 2019-01-01 RX ORDER — ISOSORBIDE MONONITRATE 30 MG/1
30 TABLET, EXTENDED RELEASE ORAL DAILY
Qty: 30 TABLET | Refills: 3 | Status: SHIPPED | OUTPATIENT
Start: 2019-01-01 | End: 2019-01-01 | Stop reason: SDUPTHER

## 2019-01-01 RX ORDER — ALLOPURINOL 100 MG/1
100 TABLET ORAL 2 TIMES DAILY
Status: DISCONTINUED | OUTPATIENT
Start: 2019-01-01 | End: 2019-01-01 | Stop reason: HOSPADM

## 2019-01-01 RX ORDER — FUROSEMIDE 10 MG/ML
20 INJECTION INTRAMUSCULAR; INTRAVENOUS ONCE
Status: COMPLETED | OUTPATIENT
Start: 2019-01-01 | End: 2019-01-01

## 2019-01-01 RX ORDER — MIDODRINE HYDROCHLORIDE 5 MG/1
5 TABLET ORAL
Status: DISCONTINUED | OUTPATIENT
Start: 2019-01-01 | End: 2019-01-01

## 2019-01-01 RX ORDER — HYDRALAZINE HYDROCHLORIDE 20 MG/ML
5 INJECTION INTRAMUSCULAR; INTRAVENOUS EVERY 6 HOURS PRN
Status: DISCONTINUED | OUTPATIENT
Start: 2019-01-01 | End: 2019-01-01 | Stop reason: HOSPADM

## 2019-01-01 RX ORDER — METOLAZONE 2.5 MG/1
10 TABLET ORAL ONCE
Status: COMPLETED | OUTPATIENT
Start: 2019-01-01 | End: 2019-01-01

## 2019-01-01 RX ORDER — CARVEDILOL 3.12 MG/1
3.12 TABLET ORAL 2 TIMES DAILY WITH MEALS
Qty: 90 TABLET | Refills: 1 | Status: ON HOLD | OUTPATIENT
Start: 2019-01-01 | End: 2019-01-01 | Stop reason: HOSPADM

## 2019-01-01 RX ORDER — MIDODRINE HYDROCHLORIDE 5 MG/1
10 TABLET ORAL
Status: DISCONTINUED | OUTPATIENT
Start: 2019-01-01 | End: 2019-01-01

## 2019-01-01 RX ORDER — POTASSIUM CHLORIDE 1.5 G/1.77G
20 POWDER, FOR SOLUTION ORAL DAILY
Status: DISCONTINUED | OUTPATIENT
Start: 2019-01-01 | End: 2019-01-01

## 2019-01-01 RX ORDER — CARVEDILOL 6.25 MG/1
6.25 TABLET ORAL 2 TIMES DAILY WITH MEALS
Qty: 180 TABLET | Refills: 3 | Status: ON HOLD | OUTPATIENT
Start: 2019-01-01 | End: 2019-01-01 | Stop reason: ALTCHOICE

## 2019-01-01 RX ORDER — METOPROLOL SUCCINATE 25 MG/1
12.5 TABLET, EXTENDED RELEASE ORAL 2 TIMES DAILY
Status: DISCONTINUED | OUTPATIENT
Start: 2019-01-01 | End: 2019-01-01

## 2019-01-01 RX ORDER — ASPIRIN 81 MG/1
81 TABLET ORAL
Status: DISCONTINUED | OUTPATIENT
Start: 2019-01-01 | End: 2019-01-01 | Stop reason: HOSPADM

## 2019-01-01 RX ORDER — ALLOPURINOL 100 MG/1
100 TABLET ORAL DAILY
Status: DISCONTINUED | OUTPATIENT
Start: 2019-01-01 | End: 2019-01-01 | Stop reason: HOSPADM

## 2019-01-01 RX ORDER — FUROSEMIDE 80 MG
80 TABLET ORAL 2 TIMES DAILY
Qty: 180 TABLET | Refills: 3 | Status: SHIPPED | OUTPATIENT
Start: 2019-01-01

## 2019-01-01 RX ORDER — INSULIN GLARGINE 100 [IU]/ML
20 INJECTION, SOLUTION SUBCUTANEOUS NIGHTLY
Status: DISCONTINUED | OUTPATIENT
Start: 2019-01-01 | End: 2019-01-01 | Stop reason: HOSPADM

## 2019-01-01 RX ORDER — MIDAZOLAM HYDROCHLORIDE 1 MG/ML
0.5 INJECTION INTRAMUSCULAR; INTRAVENOUS ONCE
Status: COMPLETED | OUTPATIENT
Start: 2019-01-01 | End: 2019-01-01

## 2019-01-01 RX ORDER — SODIUM CHLORIDE 0.9 % (FLUSH) 0.9 %
SYRINGE (ML) INJECTION
Status: COMPLETED
Start: 2019-01-01 | End: 2019-01-01

## 2019-01-01 RX ORDER — METOPROLOL SUCCINATE 50 MG/1
50 TABLET, EXTENDED RELEASE ORAL DAILY
Qty: 90 TABLET | Refills: 2 | Status: SHIPPED | OUTPATIENT
Start: 2019-01-01 | End: 2019-01-01 | Stop reason: ALTCHOICE

## 2019-01-01 RX ORDER — POTASSIUM CHLORIDE 1.5 G/1.77G
40 POWDER, FOR SOLUTION ORAL ONCE
Status: DISCONTINUED | OUTPATIENT
Start: 2019-01-01 | End: 2019-01-01

## 2019-01-01 RX ORDER — POTASSIUM CHLORIDE 1.5 G/1.77G
40 POWDER, FOR SOLUTION ORAL EVERY 4 HOURS
Status: DISPENSED | OUTPATIENT
Start: 2019-01-01 | End: 2019-01-01

## 2019-01-01 RX ORDER — CALCIUM CHLORIDE 100 MG/ML
INJECTION INTRAVENOUS; INTRAVENTRICULAR DAILY PRN
Status: DISCONTINUED | OUTPATIENT
Start: 2019-01-01 | End: 2019-01-01 | Stop reason: HOSPADM

## 2019-01-01 RX ORDER — METOPROLOL SUCCINATE 25 MG/1
25 TABLET, EXTENDED RELEASE ORAL 2 TIMES DAILY
Qty: 30 TABLET | Refills: 3 | Status: SHIPPED | OUTPATIENT
Start: 2019-01-01 | End: 2019-01-01 | Stop reason: SDUPTHER

## 2019-01-01 RX ORDER — ISOSORBIDE MONONITRATE 30 MG/1
30 TABLET, EXTENDED RELEASE ORAL DAILY
Qty: 90 TABLET | Refills: 3 | Status: CANCELLED | OUTPATIENT
Start: 2019-01-01

## 2019-01-01 RX ORDER — METOPROLOL SUCCINATE 50 MG/1
50 TABLET, EXTENDED RELEASE ORAL DAILY
Status: DISCONTINUED | OUTPATIENT
Start: 2019-01-01 | End: 2019-01-01 | Stop reason: HOSPADM

## 2019-01-01 RX ORDER — OXYMETAZOLINE HYDROCHLORIDE 0.05 G/100ML
2 SPRAY NASAL 2 TIMES DAILY PRN
Status: ACTIVE | OUTPATIENT
Start: 2019-01-01 | End: 2019-01-01

## 2019-01-01 RX ORDER — POLYETHYLENE GLYCOL 3350 17 G/17G
17 POWDER, FOR SOLUTION ORAL DAILY
Status: DISCONTINUED | OUTPATIENT
Start: 2019-01-01 | End: 2019-01-01 | Stop reason: HOSPADM

## 2019-01-01 RX ORDER — SODIUM CHLORIDE 9 MG/ML
INJECTION, SOLUTION INTRAVENOUS CONTINUOUS PRN
Status: DISCONTINUED | OUTPATIENT
Start: 2019-01-01 | End: 2019-01-01 | Stop reason: HOSPADM

## 2019-01-01 RX ORDER — METOPROLOL SUCCINATE 25 MG/1
25 TABLET, EXTENDED RELEASE ORAL DAILY
Qty: 30 TABLET | Refills: 5 | Status: SHIPPED | OUTPATIENT
Start: 2019-01-01 | End: 2019-01-01 | Stop reason: DRUGHIGH

## 2019-01-01 RX ORDER — SODIUM CHLORIDE, SODIUM LACTATE, POTASSIUM CHLORIDE, CALCIUM CHLORIDE 600; 310; 30; 20 MG/100ML; MG/100ML; MG/100ML; MG/100ML
INJECTION, SOLUTION INTRAVENOUS CONTINUOUS
Status: DISCONTINUED | OUTPATIENT
Start: 2019-01-01 | End: 2019-01-01

## 2019-01-01 RX ORDER — POTASSIUM CHLORIDE 1.5 G/1.77G
20 POWDER, FOR SOLUTION ORAL DAILY
Status: ON HOLD | COMMUNITY
End: 2019-01-01 | Stop reason: ALTCHOICE

## 2019-01-01 RX ORDER — METOPROLOL SUCCINATE 25 MG/1
TABLET, EXTENDED RELEASE ORAL
Qty: 90 TABLET | Refills: 3 | Status: SHIPPED | OUTPATIENT
Start: 2019-01-01 | End: 2019-01-01 | Stop reason: ALTCHOICE

## 2019-01-01 RX ADMIN — SODIUM CHLORIDE, PRESERVATIVE FREE 10 ML: 5 INJECTION INTRAVENOUS at 09:48

## 2019-01-01 RX ADMIN — INSULIN GLARGINE 10 UNITS: 100 INJECTION, SOLUTION SUBCUTANEOUS at 21:03

## 2019-01-01 RX ADMIN — ALLOPURINOL 100 MG: 100 TABLET ORAL at 00:13

## 2019-01-01 RX ADMIN — FUROSEMIDE 80 MG: 40 TABLET ORAL at 17:02

## 2019-01-01 RX ADMIN — METOPROLOL SUCCINATE 12.5 MG: 25 TABLET, EXTENDED RELEASE ORAL at 06:09

## 2019-01-01 RX ADMIN — METOPROLOL SUCCINATE 25 MG: 25 TABLET, EXTENDED RELEASE ORAL at 17:55

## 2019-01-01 RX ADMIN — CALCITRIOL CAPSULES 0.25 MCG 0.25 MCG: 0.25 CAPSULE ORAL at 09:36

## 2019-01-01 RX ADMIN — Medication 400 MG: at 09:30

## 2019-01-01 RX ADMIN — SODIUM CHLORIDE, PRESERVATIVE FREE 10 ML: 5 INJECTION INTRAVENOUS at 08:42

## 2019-01-01 RX ADMIN — METOPROLOL SUCCINATE 50 MG: 50 TABLET, EXTENDED RELEASE ORAL at 09:36

## 2019-01-01 RX ADMIN — PRAVASTATIN SODIUM 10 MG: 10 TABLET ORAL at 21:01

## 2019-01-01 RX ADMIN — ISOSORBIDE MONONITRATE 30 MG: 30 TABLET, EXTENDED RELEASE ORAL at 09:39

## 2019-01-01 RX ADMIN — FUROSEMIDE 40 MG: 10 INJECTION, SOLUTION INTRAMUSCULAR; INTRAVENOUS at 16:56

## 2019-01-01 RX ADMIN — CARVEDILOL 6.25 MG: 6.25 TABLET, FILM COATED ORAL at 09:30

## 2019-01-01 RX ADMIN — CALCITRIOL CAPSULES 0.25 MCG 0.25 MCG: 0.25 CAPSULE ORAL at 16:25

## 2019-01-01 RX ADMIN — VITAMIN E CAP 400 UNIT 400 UNITS: 400 CAP at 08:40

## 2019-01-01 RX ADMIN — ASPIRIN 81 MG 81 MG: 81 TABLET ORAL at 09:39

## 2019-01-01 RX ADMIN — Medication 400 MG: at 08:11

## 2019-01-01 RX ADMIN — CALCITRIOL CAPSULES 0.25 MCG 0.25 MCG: 0.25 CAPSULE ORAL at 19:57

## 2019-01-01 RX ADMIN — FUROSEMIDE 40 MG: 10 INJECTION, SOLUTION INTRAVENOUS at 02:38

## 2019-01-01 RX ADMIN — CALCITRIOL CAPSULES 0.25 MCG 0.25 MCG: 0.25 CAPSULE ORAL at 22:05

## 2019-01-01 RX ADMIN — PRAVASTATIN SODIUM 10 MG: 10 TABLET ORAL at 23:08

## 2019-01-01 RX ADMIN — ISOSORBIDE MONONITRATE 30 MG: 30 TABLET, EXTENDED RELEASE ORAL at 09:30

## 2019-01-01 RX ADMIN — ASPIRIN 81 MG 81 MG: 81 TABLET ORAL at 09:34

## 2019-01-01 RX ADMIN — MIDODRINE HYDROCHLORIDE 5 MG: 5 TABLET ORAL at 18:38

## 2019-01-01 RX ADMIN — SODIUM CHLORIDE, PRESERVATIVE FREE 10 ML: 5 INJECTION INTRAVENOUS at 00:14

## 2019-01-01 RX ADMIN — ALLOPURINOL 100 MG: 100 TABLET ORAL at 22:04

## 2019-01-01 RX ADMIN — FUROSEMIDE 80 MG: 40 TABLET ORAL at 09:45

## 2019-01-01 RX ADMIN — DIGOXIN 250 MCG: 0.25 INJECTION INTRAMUSCULAR; INTRAVENOUS at 02:16

## 2019-01-01 RX ADMIN — PRAVASTATIN SODIUM 10 MG: 10 TABLET ORAL at 19:57

## 2019-01-01 RX ADMIN — BUMETANIDE 2 MG: 0.25 INJECTION INTRAMUSCULAR; INTRAVENOUS at 14:54

## 2019-01-01 RX ADMIN — SODIUM CHLORIDE, PRESERVATIVE FREE 10 ML: 5 INJECTION INTRAVENOUS at 22:01

## 2019-01-01 RX ADMIN — PRAVASTATIN SODIUM 10 MG: 10 TABLET ORAL at 16:56

## 2019-01-01 RX ADMIN — METOPROLOL SUCCINATE 50 MG: 50 TABLET, EXTENDED RELEASE ORAL at 16:25

## 2019-01-01 RX ADMIN — POLYETHYLENE GLYCOL (3350) 17 G: 17 POWDER, FOR SOLUTION ORAL at 15:07

## 2019-01-01 RX ADMIN — CALCIUM CHLORIDE 1 G: 100 INJECTION INTRAVENOUS; INTRAVENTRICULAR at 11:06

## 2019-01-01 RX ADMIN — ALLOPURINOL 200 MG: 100 TABLET ORAL at 16:25

## 2019-01-01 RX ADMIN — ALLOPURINOL 100 MG: 100 TABLET ORAL at 09:36

## 2019-01-01 RX ADMIN — CALCITRIOL 0.25 MCG: 0.25 CAPSULE ORAL at 08:40

## 2019-01-01 RX ADMIN — SODIUM CHLORIDE, PRESERVATIVE FREE 10 ML: 5 INJECTION INTRAVENOUS at 22:15

## 2019-01-01 RX ADMIN — ALLOPURINOL 100 MG: 100 TABLET ORAL at 09:30

## 2019-01-01 RX ADMIN — Medication 400 UNITS: at 13:49

## 2019-01-01 RX ADMIN — SODIUM CHLORIDE, PRESERVATIVE FREE: 5 INJECTION INTRAVENOUS at 08:30

## 2019-01-01 RX ADMIN — BUMETANIDE 2 MG/HR: 0.25 INJECTION INTRAMUSCULAR; INTRAVENOUS at 01:41

## 2019-01-01 RX ADMIN — CARVEDILOL 6.25 MG: 6.25 TABLET, FILM COATED ORAL at 17:35

## 2019-01-01 RX ADMIN — ALLOPURINOL 100 MG: 100 TABLET ORAL at 09:39

## 2019-01-01 RX ADMIN — METOPROLOL SUCCINATE 12.5 MG: 25 TABLET, EXTENDED RELEASE ORAL at 22:20

## 2019-01-01 RX ADMIN — SODIUM CHLORIDE, PRESERVATIVE FREE 10 ML: 5 INJECTION INTRAVENOUS at 07:59

## 2019-01-01 RX ADMIN — BUMETANIDE 2 MG/HR: 0.25 INJECTION INTRAMUSCULAR; INTRAVENOUS at 08:31

## 2019-01-01 RX ADMIN — ALLOPURINOL 100 MG: 100 TABLET ORAL at 08:10

## 2019-01-01 RX ADMIN — Medication 400 UNITS: at 12:40

## 2019-01-01 RX ADMIN — BUMETANIDE 2 MG/HR: 0.25 INJECTION INTRAMUSCULAR; INTRAVENOUS at 11:50

## 2019-01-01 RX ADMIN — METOPROLOL SUCCINATE 25 MG: 25 TABLET, EXTENDED RELEASE ORAL at 17:01

## 2019-01-01 RX ADMIN — CARVEDILOL 6.25 MG: 6.25 TABLET, FILM COATED ORAL at 19:18

## 2019-01-01 RX ADMIN — ISOSORBIDE MONONITRATE 30 MG: 30 TABLET, EXTENDED RELEASE ORAL at 10:22

## 2019-01-01 RX ADMIN — METOPROLOL TARTRATE 25 MG: 25 TABLET ORAL at 08:40

## 2019-01-01 RX ADMIN — CALCITRIOL CAPSULES 0.25 MCG 0.25 MCG: 0.25 CAPSULE ORAL at 13:50

## 2019-01-01 RX ADMIN — INSULIN LISPRO 2 UNITS: 100 INJECTION, SOLUTION INTRAVENOUS; SUBCUTANEOUS at 13:06

## 2019-01-01 RX ADMIN — MAGNESIUM OXIDE TAB 400 MG (241.3 MG ELEMENTAL MG) 400 MG: 400 (241.3 MG) TAB at 08:40

## 2019-01-01 RX ADMIN — ALLOPURINOL 100 MG: 100 TABLET ORAL at 19:57

## 2019-01-01 RX ADMIN — MIDODRINE HYDROCHLORIDE 5 MG: 5 TABLET ORAL at 17:02

## 2019-01-01 RX ADMIN — CALCITRIOL CAPSULES 0.25 MCG 0.25 MCG: 0.25 CAPSULE ORAL at 00:13

## 2019-01-01 RX ADMIN — ENOXAPARIN SODIUM 30 MG: 30 INJECTION SUBCUTANEOUS at 09:36

## 2019-01-01 RX ADMIN — ALLOPURINOL 100 MG: 100 TABLET ORAL at 23:08

## 2019-01-01 RX ADMIN — Medication 10 ML: at 09:33

## 2019-01-01 RX ADMIN — ISOSORBIDE MONONITRATE 30 MG: 30 TABLET, EXTENDED RELEASE ORAL at 08:43

## 2019-01-01 RX ADMIN — Medication 400 UNITS: at 09:33

## 2019-01-01 RX ADMIN — ISOSORBIDE MONONITRATE 30 MG: 30 TABLET, EXTENDED RELEASE ORAL at 16:25

## 2019-01-01 RX ADMIN — ASPIRIN 81 MG 81 MG: 81 TABLET ORAL at 10:21

## 2019-01-01 RX ADMIN — BUMETANIDE 2 MG/HR: 0.25 INJECTION INTRAMUSCULAR; INTRAVENOUS at 20:18

## 2019-01-01 RX ADMIN — FUROSEMIDE 40 MG: 10 INJECTION, SOLUTION INTRAMUSCULAR; INTRAVENOUS at 06:43

## 2019-01-01 RX ADMIN — CHOLECALCIFEROL TAB 10 MCG (400 UNIT) 400 UNITS: 10 TAB at 08:40

## 2019-01-01 RX ADMIN — Medication: at 08:30

## 2019-01-01 RX ADMIN — MIDAZOLAM HYDROCHLORIDE 0.5 MG: 1 INJECTION, SOLUTION INTRAMUSCULAR; INTRAVENOUS at 17:44

## 2019-01-01 RX ADMIN — FUROSEMIDE 80 MG: 40 TABLET ORAL at 17:54

## 2019-01-01 RX ADMIN — Medication 400 UNITS: at 09:47

## 2019-01-01 RX ADMIN — SODIUM CHLORIDE, PRESERVATIVE FREE 10 ML: 5 INJECTION INTRAVENOUS at 09:43

## 2019-01-01 RX ADMIN — Medication 400 MG: at 09:42

## 2019-01-01 RX ADMIN — POTASSIUM CHLORIDE 40 MEQ: 1.5 POWDER, FOR SOLUTION ORAL at 14:50

## 2019-01-01 RX ADMIN — DOCUSATE SODIUM 100 MG: 100 CAPSULE, LIQUID FILLED ORAL at 09:30

## 2019-01-01 RX ADMIN — SODIUM CHLORIDE, PRESERVATIVE FREE 10 ML: 5 INJECTION INTRAVENOUS at 21:28

## 2019-01-01 RX ADMIN — CARVEDILOL 6.25 MG: 6.25 TABLET, FILM COATED ORAL at 08:10

## 2019-01-01 RX ADMIN — Medication 400 MG: at 09:34

## 2019-01-01 RX ADMIN — Medication 10 ML: at 13:51

## 2019-01-01 RX ADMIN — METOPROLOL TARTRATE 25 MG: 25 TABLET ORAL at 13:03

## 2019-01-01 RX ADMIN — ALLOPURINOL 100 MG: 100 TABLET ORAL at 08:33

## 2019-01-01 RX ADMIN — SODIUM CHLORIDE 1000 ML/HR: 9 INJECTION, SOLUTION INTRAVENOUS at 11:06

## 2019-01-01 RX ADMIN — ALLOPURINOL 100 MG: 100 TABLET ORAL at 09:33

## 2019-01-01 RX ADMIN — ALLOPURINOL 100 MG: 100 TABLET ORAL at 10:21

## 2019-01-01 RX ADMIN — INSULIN LISPRO 5 UNITS: 100 INJECTION, SOLUTION INTRAVENOUS; SUBCUTANEOUS at 13:56

## 2019-01-01 RX ADMIN — MIDODRINE HYDROCHLORIDE 10 MG: 5 TABLET ORAL at 23:56

## 2019-01-01 RX ADMIN — ASPIRIN 81 MG 81 MG: 81 TABLET ORAL at 08:11

## 2019-01-01 RX ADMIN — ALLOPURINOL 100 MG: 100 TABLET ORAL at 08:40

## 2019-01-01 RX ADMIN — ISOSORBIDE MONONITRATE 30 MG: 30 TABLET, EXTENDED RELEASE ORAL at 08:11

## 2019-01-01 RX ADMIN — FUROSEMIDE 80 MG: 40 TABLET ORAL at 16:25

## 2019-01-01 RX ADMIN — ASPIRIN 81 MG 81 MG: 81 TABLET ORAL at 09:30

## 2019-01-01 RX ADMIN — HEPARIN SODIUM 3500 UNITS: 1000 INJECTION INTRAVENOUS; SUBCUTANEOUS at 11:54

## 2019-01-01 RX ADMIN — Medication 400 MG: at 10:21

## 2019-01-01 RX ADMIN — SODIUM CHLORIDE, PRESERVATIVE FREE 10 ML: 5 INJECTION INTRAVENOUS at 21:15

## 2019-01-01 RX ADMIN — HEPARIN SODIUM 5000 UNITS: 5000 INJECTION, SOLUTION INTRAVENOUS; SUBCUTANEOUS at 21:05

## 2019-01-01 RX ADMIN — INSULIN LISPRO 1 UNITS: 100 INJECTION, SOLUTION INTRAVENOUS; SUBCUTANEOUS at 21:06

## 2019-01-01 RX ADMIN — INSULIN GLARGINE 20 UNITS: 100 INJECTION, SOLUTION SUBCUTANEOUS at 22:24

## 2019-01-01 RX ADMIN — FUROSEMIDE 20 MG: 10 INJECTION, SOLUTION INTRAVENOUS at 04:01

## 2019-01-01 RX ADMIN — INSULIN LISPRO 5 UNITS: 100 INJECTION, SOLUTION INTRAVENOUS; SUBCUTANEOUS at 18:22

## 2019-01-01 RX ADMIN — METOPROLOL SUCCINATE 25 MG: 25 TABLET, EXTENDED RELEASE ORAL at 06:02

## 2019-01-01 RX ADMIN — CARVEDILOL 6.25 MG: 6.25 TABLET, FILM COATED ORAL at 09:33

## 2019-01-01 RX ADMIN — CARVEDILOL 6.25 MG: 6.25 TABLET, FILM COATED ORAL at 18:30

## 2019-01-01 RX ADMIN — PRAVASTATIN SODIUM 10 MG: 10 TABLET ORAL at 22:14

## 2019-01-01 RX ADMIN — Medication 10 ML: at 21:02

## 2019-01-01 RX ADMIN — FUROSEMIDE 80 MG: 40 TABLET ORAL at 12:39

## 2019-01-01 RX ADMIN — MIDODRINE HYDROCHLORIDE 5 MG: 5 TABLET ORAL at 08:16

## 2019-01-01 RX ADMIN — POLYETHYLENE GLYCOL (3350) 17 G: 17 POWDER, FOR SOLUTION ORAL at 09:30

## 2019-01-01 RX ADMIN — FUROSEMIDE 40 MG: 40 TABLET ORAL at 10:21

## 2019-01-01 RX ADMIN — SODIUM CHLORIDE, PRESERVATIVE FREE 10 ML: 5 INJECTION INTRAVENOUS at 22:02

## 2019-01-01 RX ADMIN — ISOSORBIDE MONONITRATE 30 MG: 30 TABLET, EXTENDED RELEASE ORAL at 09:36

## 2019-01-01 RX ADMIN — CALCITRIOL CAPSULES 0.25 MCG 0.25 MCG: 0.25 CAPSULE ORAL at 12:39

## 2019-01-01 RX ADMIN — METOPROLOL TARTRATE 25 MG: 25 TABLET ORAL at 22:22

## 2019-01-01 RX ADMIN — FUROSEMIDE 80 MG: 40 TABLET ORAL at 09:33

## 2019-01-01 RX ADMIN — PRAVASTATIN SODIUM 10 MG: 10 TABLET ORAL at 09:35

## 2019-01-01 RX ADMIN — PRAVASTATIN SODIUM 10 MG: 10 TABLET ORAL at 22:21

## 2019-01-01 RX ADMIN — MIDODRINE HYDROCHLORIDE 5 MG: 5 TABLET ORAL at 13:49

## 2019-01-01 RX ADMIN — CALCITRIOL CAPSULES 0.25 MCG 0.25 MCG: 0.25 CAPSULE ORAL at 23:08

## 2019-01-01 RX ADMIN — PRAVASTATIN SODIUM 10 MG: 10 TABLET ORAL at 16:25

## 2019-01-01 RX ADMIN — FUROSEMIDE 10 MG/HR: 10 INJECTION, SOLUTION INTRAVENOUS at 07:03

## 2019-01-01 RX ADMIN — ISOSORBIDE MONONITRATE 30 MG: 30 TABLET, EXTENDED RELEASE ORAL at 08:33

## 2019-01-01 RX ADMIN — Medication 400 MG: at 08:33

## 2019-01-01 RX ADMIN — SODIUM CHLORIDE, PRESERVATIVE FREE 10 ML: 5 INJECTION INTRAVENOUS at 08:38

## 2019-01-01 RX ADMIN — ALLOPURINOL 200 MG: 100 TABLET ORAL at 09:34

## 2019-01-01 RX ADMIN — POTASSIUM BICARBONATE 40 MEQ: 782 TABLET, EFFERVESCENT ORAL at 09:34

## 2019-01-01 RX ADMIN — ISOSORBIDE MONONITRATE 30 MG: 30 TABLET, EXTENDED RELEASE ORAL at 09:35

## 2019-01-01 RX ADMIN — CALCITRIOL CAPSULES 0.25 MCG 0.25 MCG: 0.25 CAPSULE ORAL at 21:15

## 2019-01-01 RX ADMIN — CARVEDILOL 6.25 MG: 6.25 TABLET, FILM COATED ORAL at 09:39

## 2019-01-01 RX ADMIN — METOLAZONE 10 MG: 2.5 TABLET ORAL at 10:49

## 2019-01-01 RX ADMIN — ISOSORBIDE MONONITRATE 30 MG: 30 TABLET, EXTENDED RELEASE ORAL at 16:56

## 2019-01-01 RX ADMIN — CALCITRIOL CAPSULES 0.25 MCG 0.25 MCG: 0.25 CAPSULE ORAL at 09:33

## 2019-01-01 RX ADMIN — Medication 400 MG: at 09:36

## 2019-01-01 RX ADMIN — Medication 1 MG: at 11:07

## 2019-01-01 RX ADMIN — ALLOPURINOL 200 MG: 100 TABLET ORAL at 13:48

## 2019-01-01 RX ADMIN — CARVEDILOL 6.25 MG: 6.25 TABLET, FILM COATED ORAL at 17:20

## 2019-01-01 RX ADMIN — Medication 25 MCG: at 17:43

## 2019-01-01 RX ADMIN — SODIUM CHLORIDE, PRESERVATIVE FREE 10 ML: 5 INJECTION INTRAVENOUS at 09:49

## 2019-01-01 RX ADMIN — ALLOPURINOL 100 MG: 100 TABLET ORAL at 21:09

## 2019-01-01 RX ADMIN — HEPARIN SODIUM 3500 UNITS: 1000 INJECTION, SOLUTION INTRAVENOUS; SUBCUTANEOUS at 12:20

## 2019-01-01 RX ADMIN — CARVEDILOL 6.25 MG: 6.25 TABLET, FILM COATED ORAL at 10:22

## 2019-01-01 RX ADMIN — DOCUSATE SODIUM 100 MG: 100 CAPSULE, LIQUID FILLED ORAL at 15:07

## 2019-01-01 RX ADMIN — ALLOPURINOL 100 MG: 100 TABLET ORAL at 08:39

## 2019-01-01 RX ADMIN — HEPARIN SODIUM 3500 UNITS: 1000 INJECTION, SOLUTION INTRAVENOUS; SUBCUTANEOUS at 20:40

## 2019-01-01 RX ADMIN — SODIUM CHLORIDE, PRESERVATIVE FREE 10 ML: 5 INJECTION INTRAVENOUS at 21:00

## 2019-01-01 RX ADMIN — HEPARIN SODIUM 5000 UNITS: 5000 INJECTION, SOLUTION INTRAVENOUS; SUBCUTANEOUS at 13:05

## 2019-01-01 RX ADMIN — PRAVASTATIN SODIUM 10 MG: 10 TABLET ORAL at 00:13

## 2019-01-01 RX ADMIN — BUMETANIDE 2 MG: 0.25 INJECTION INTRAMUSCULAR; INTRAVENOUS at 22:04

## 2019-01-01 RX ADMIN — ALLOPURINOL 100 MG: 100 TABLET ORAL at 21:15

## 2019-01-01 RX ADMIN — PRAVASTATIN SODIUM 10 MG: 10 TABLET ORAL at 21:15

## 2019-01-01 RX ADMIN — FUROSEMIDE 10 MG/HR: 10 INJECTION, SOLUTION INTRAVENOUS at 16:49

## 2019-01-01 RX ADMIN — ALLOPURINOL 200 MG: 100 TABLET ORAL at 12:39

## 2019-01-01 RX ADMIN — FUROSEMIDE 80 MG: 40 TABLET ORAL at 22:22

## 2019-01-01 RX ADMIN — CARVEDILOL 6.25 MG: 6.25 TABLET, FILM COATED ORAL at 18:23

## 2019-01-01 RX ADMIN — HEPARIN SODIUM 5000 UNITS: 5000 INJECTION, SOLUTION INTRAVENOUS; SUBCUTANEOUS at 06:30

## 2019-01-01 RX ADMIN — SODIUM CHLORIDE, PRESERVATIVE FREE 10 ML: 5 INJECTION INTRAVENOUS at 19:57

## 2019-01-01 RX ADMIN — FUROSEMIDE 10 MG/HR: 10 INJECTION, SOLUTION INTRAVENOUS at 01:40

## 2019-01-01 RX ADMIN — ASPIRIN 81 MG 81 MG: 81 TABLET ORAL at 08:32

## 2019-01-01 RX ADMIN — FUROSEMIDE 80 MG: 40 TABLET ORAL at 09:35

## 2019-01-01 RX ADMIN — CARVEDILOL 6.25 MG: 6.25 TABLET, FILM COATED ORAL at 08:33

## 2019-01-01 RX ADMIN — VITAMIN E CAP 400 UNIT 400 UNITS: 400 CAP at 08:41

## 2019-01-01 RX ADMIN — ASPIRIN 81 MG 81 MG: 81 TABLET ORAL at 08:37

## 2019-01-01 RX ADMIN — ASPIRIN 81 MG 81 MG: 81 TABLET ORAL at 08:40

## 2019-01-01 RX ADMIN — BUMETANIDE 2 MG: 0.25 INJECTION INTRAMUSCULAR; INTRAVENOUS at 06:29

## 2019-01-01 ASSESSMENT — PAIN SCALES - GENERAL
PAINLEVEL_OUTOF10: 0
PAINLEVEL_OUTOF10: 10
PAINLEVEL_OUTOF10: 0

## 2019-01-01 ASSESSMENT — ENCOUNTER SYMPTOMS
WHEEZING: 0
NAUSEA: 0
PHOTOPHOBIA: 0
COUGH: 0
DIARRHEA: 0
EYE PAIN: 0
BLOOD IN STOOL: 0
ABDOMINAL PAIN: 0
SHORTNESS OF BREATH: 1
CHEST TIGHTNESS: 0
CONSTIPATION: 0
COLOR CHANGE: 0
SHORTNESS OF BREATH: 1
BACK PAIN: 0
VOMITING: 0
ABDOMINAL DISTENTION: 1

## 2019-01-01 ASSESSMENT — PAIN DESCRIPTION - FREQUENCY
FREQUENCY: CONTINUOUS
FREQUENCY: CONTINUOUS

## 2019-01-01 ASSESSMENT — PAIN DESCRIPTION - LOCATION
LOCATION: LEG
LOCATION: LEG

## 2019-01-01 ASSESSMENT — PAIN DESCRIPTION - ORIENTATION
ORIENTATION: LEFT
ORIENTATION: RIGHT;LEFT;LOWER

## 2019-01-01 ASSESSMENT — PAIN DESCRIPTION - PROGRESSION: CLINICAL_PROGRESSION: GRADUALLY WORSENING

## 2019-01-01 ASSESSMENT — PAIN DESCRIPTION - PAIN TYPE: TYPE: CHRONIC PAIN

## 2019-01-01 ASSESSMENT — PAIN DESCRIPTION - DESCRIPTORS
DESCRIPTORS: BURNING;ACHING;SHARP;SORE
DESCRIPTORS: SHARP;SHOOTING

## 2019-01-01 ASSESSMENT — PAIN - FUNCTIONAL ASSESSMENT: PAIN_FUNCTIONAL_ASSESSMENT: PREVENTS OR INTERFERES SOME ACTIVE ACTIVITIES AND ADLS

## 2019-01-01 ASSESSMENT — PAIN DESCRIPTION - ONSET: ONSET: ON-GOING

## 2019-04-30 PROBLEM — I50.23 ACUTE ON CHRONIC SYSTOLIC CHF (CONGESTIVE HEART FAILURE) (HCC): Status: ACTIVE | Noted: 2019-01-01

## 2019-04-30 NOTE — ED PROVIDER NOTES
Triage Chief Complaint:   Shortness of Breath    [place]:  Javier Cueva is a 70 y.o. male that presents with cough and shortness of breath. Patient states that he has had a nonproductive cough over the past few days. He states that he went to lie down in bed around 9 PM last night he started to feel short of breath. He states that this is better when he sits up. He denies any shortness breath with exertion that is new. Denies any chest pain, fevers, abdominal pain, nausea, vomiting, new lower extremity pain or swelling. No recent medication changes. ROS:  At least 14 systems reviewed and otherwise acutely negative except as in the [address]     Past Medical History:   Diagnosis Date    CAD (coronary artery disease)     Cardiomyopathy ([facility] [address]     EF 27%    Diabetes mellitus ([facility] [address]     Gout     [facility] of cardiovascular [facility]    [facility]    History of echocardiogram 01/12/2018    EF 25% [facility] -[facility]    Hyperlipidemia     Hypertension      Past Surgical History:   Procedure Laterality Date    CARDIAC SURGERY      4 stents    CATARACT REMOVAL       Family History   Problem Relation Age of Onset    Cancer Mother     Diabetes Father     Heart Disease Father     Kidney Disease Father      Social History     Socioeconomic History    Marital status:      Spouse name: Not on file    Number of children: Not on file    Years of education: Not on file    Highest education level: Not on file   Occupational History    Not on file   Social Needs    Financial resource strain: Not on file    Food insecurity:     Worry: Not on file     Inability: Not on file    Transportation needs:     Medical: Not on file     Non-medical: Not on file   Tobacco Use    Smoking status: Former Smoker     Years: 11.00     Types: Cigarettes    Smokeless tobacco: Former User   Substance and Sexual Activity    Alcohol use: No    Drug use: No    Sexual activity: Yes     Partners: Female   Lifestyle    Absolute 8.1 K/CU MM    Lymphocytes # 0.7 K/CU MM    Monocytes # 0.8 K/CU MM    Eosinophils # 0.3 K/CU MM    Basophils # 0.0 K/CU MM    Nucleated RBC % 0.0 %    Total Nucleated RBC 0.0 K/CU MM    Total Immature Neutrophil 0.04 K/CU MM    Immature Neutrophil % 0.4 0 - 0.43 %   CMP   Result Value Ref Range    Sodium 136 135 - 145 MMOL/L    Potassium 4.2 3.5 - 5.1 MMOL/L    Chloride 104 99 - 110 mMol/L    CO2 20 (L) 21 - 32 MMOL/L     (H) 6 - 23 MG/DL    CREATININE 3.2 (H) 0.9 - 1.3 MG/DL    Glucose 143 (H) 70 - 99 MG/DL    Calcium 8.8 8.3 - 10.6 MG/DL    Alb 3.7 3.4 - 5.0 GM/DL    Total Protein 6.7 6.4 - 8.2 GM/DL    Total Bilirubin 0.3 0.0 - 1.0 MG/DL    ALT 43 (H) 10 - 40 U/L    AST 40 (H) 15 - 37 IU/L    Alkaline Phosphatase 169 (H) 40 - 129 IU/L    GFR Non- 19 (L) >60 mL/min/1.73m2    GFR  23 (L) >60 mL/min/1.73m2    Anion Gap 12 4 - 16   Troponin   Result Value Ref Range    Troponin T 0.350 (HH) <0.01 NG/ML   Brain Natriuretic Peptide   Result Value Ref Range    Pro-BNP 6,617 (H) <300 PG/ML   EKG 12 Lead   Result Value Ref Range    Ventricular Rate 73 BPM    Atrial Rate 73 BPM    P-R Interval 158 ms    QRS Duration 106 ms    Q-T Interval 394 ms    QTc Calculation (Bazett) 434 ms    P Axis 71 degrees    R Axis -3 degrees    T Axis 155 degrees    Diagnosis       Normal sinus rhythm  Septal infarct , age undetermined  ST & T wave abnormality, consider lateral ischemia  Abnormal ECG  No previous ECGs available        Radiographs (if obtained):  [] The following radiograph was interpreted by myself in the absence of a radiologist:  [x] Radiologist's Report Reviewed:    EKG (if obtained): (All EKG's are interpreted by myself in the absence of a cardiologist)  No change from 1/9/19. Normal sinus rhythm with normal axis. Incomplete left bundle branch block. Lateral T wave inversions. Age-indeterminate septal infarct.   QTC is normal.    MDM:  Plan of care is discussed thoroughly

## 2019-04-30 NOTE — CONSULTS
CARDIOLOGY CONSULT NOTE   Reason for consultation:  SOB/Elevated troponin    Referring physician:  Ritesh Hall MD     Primary care physician: Jolie Dillard MD      Dear  Dr. Ritesh Hall MD   Thanks for the consult. Chief Complaints :  Chief Complaint   Patient presents with    Shortness of Breath        History of present illness:Manjit is a 70 y. o.year old who presents with worsening shortness of breath. He could not lie flat at home. He has long-standing history of ischemic cardiomyopathy. He also has renal insufficiency with creatinine 3.2. Cannot pinpoint any inciting factors. He's been taking his medication regularly and watching his diet. Says that he was tried to lay down flat at home when he suddenly felt more short of breath. He was unable to lie down after coming to the emergency department c, he got  Lasix. His symptoms are much improved today. Last stress test at [facility] showed EF of 22% with multiple areas of ischemia. He underwent multivessel PCI in 2011  . He is being managed medically for cad  due to renal failure. Past medical history:    has a past medical history of CAD (coronary artery disease), Cardiomyopathy ([facility] [address] Diabetes mellitus ([facility] [address] Gout, History of cardiovascular stress test, History of echocardiogram, Hyperlipidemia, and Hypertension. Past surgical history:   has a past surgical history that includes Cardiac surgery and Cataract removal.  Social History:   reports that he has quit smoking. His smoking use included cigarettes. He quit after 11.00 years of use. He has quit using smokeless tobacco. He reports that he does not drink alcohol or use drugs.   Family history:   no family history of CAD, STROKE of DM at early age    Allergies   Allergen Reactions    Penicillins Rash    Tetracyclines & Related Rash         sodium chloride flush 0.9 % injection 10 mL 2 times per day   sodium chloride flush 0.9 % injection 10 mL PRN   magnesium hydroxide (MILK OF MAGNESIA) 400 MG/5ML suspension 30 mL Daily PRN   ondansetron (ZOFRAN) injection 4 mg Q6H PRN   potassium chloride (KLOR-CON M) extended release tablet 40 mEq PRN   Or    potassium chloride (KLOR-CON) packet 40 mEq PRN   Or    potassium chloride 10 mEq/100 mL IVPB (Peripheral Line) PRN   famotidine (PEPCID) tablet 20 mg Daily   heparin (porcine) injection 5,000 Units 3 times per day   acetaminophen (TYLENOL) tablet 650 mg Q4H PRN   allopurinol (ZYLOPRIM) tablet 100 mg BID   calcitRIOL (ROCALTROL) capsule 0.25 mcg Daily   vitamin D3 (CHOLECALCIFEROL) tablet 400 Units Daily   insulin lispro (HUMALOG) injection vial 0-12 Units TID WC   insulin lispro (HUMALOG) injection vial 0-6 Units Nightly   glucose (GLUTOSE) 40 % oral gel 15 g PRN   dextrose 50 % solution 12.5 g PRN   glucagon (rDNA) injection 1 mg PRN   dextrose 5 % solution PRN   magnesium oxide (MAG-OX) tablet 400 mg Daily   metoprolol tartrate (LOPRESSOR) tablet 25 mg BID   pravastatin (PRAVACHOL) tablet 10 mg Daily   vitamin E capsule 400 Units Daily   aspirin chewable tablet 81 mg Daily   insulin glargine (LANTUS) injection vial 24 Units Nightly   furosemide (LASIX) injection 40 mg BID     Current Facility-Administered Medications   Medication Dose Route Frequency Provider Last Rate Last Dose    sodium chloride flush 0.9 % injection 10 mL  10 mL Intravenous 2 times per day Kareem Faria MD   10 mL at 04/30/19 0842    sodium chloride flush 0.9 % injection 10 mL  10 mL Intravenous PRN Kareem Faria MD        magnesium hydroxide (MILK OF MAGNESIA) 400 MG/5ML suspension 30 mL  30 mL Oral Daily PRN Kareem Faria MD        ondansetron [facility]) injection 4 mg  4 mg Intravenous Q6H PRN Kareem Faria MD        potassium chloride (KLOR-CON M) extended release tablet 40 mEq  40 mEq Oral PRN Kareem Faria MD        Or    potassium chloride (KLOR-CON) packet 40 mEq  40 mEq Oral PRN Kareem Faria MD        Or    potassium chloride 10 mEq/100 mL IVPB (Peripheral Line)  10 mEq Intravenous PRN Callie Neighbours, MD        famotidine (PEPCID) tablet 20 mg  20 mg Oral Daily Callie Neighbours, MD        heparin (porcine) injection 5,000 Units  5,000 Units Subcutaneous 3 times per day Callie Neighbours, MD   5,000 Units at 04/30/19 1305    acetaminophen (TYLENOL) tablet 650 mg  650 mg Oral Q4H PRN Callie Neighbours, MD        allopurinol (ZYLOPRIM) tablet 100 mg  100 mg Oral BID Callie Neighbours, MD   100 mg at 04/30/19 0840    calcitRIOL (ROCALTROL) capsule 0.25 mcg  0.25 mcg Oral Daily Callie Neighbours, MD   0.25 mcg at 04/30/19 0840    vitamin D3 (CHOLECALCIFEROL) tablet 400 Units  400 Units Oral Daily Callie Neighbours, MD   400 Units at 04/30/19 0840    insulin lispro (HUMALOG) injection vial 0-12 Units  0-12 Units Subcutaneous TID WC Callie Neighbours, MD   2 Units at 04/30/19 1306    insulin lispro (HUMALOG) injection vial 0-6 Units  0-6 Units Subcutaneous Nightly Callie Neighbours, MD        glucose (GLUTOSE) 40 % oral gel 15 g  15 g Oral PRN Callie Neighbours, MD        dextrose 50 % solution 12.5 g  12.5 g Intravenous PRN Callie Neighbours, MD        glucagon (rDNA) injection 1 mg  1 mg Intramuscular PRN Callie Neighbours, MD        dextrose 5 % solution  100 mL/hr Intravenous PRN Callie Neighbours, MD        magnesium oxide (MAG-OX) tablet 400 mg  400 mg Oral Daily Callie Neighbours, MD   400 mg at 04/30/19 0840    metoprolol tartrate (LOPRESSOR) tablet 25 mg  25 mg Oral BID Callie Neighbours, MD   25 mg at 04/30/19 1303    pravastatin (PRAVACHOL) tablet 10 mg  10 mg Oral Daily Callie Neighbours, MD        vitamin E capsule 400 Units  400 Units Oral Daily Callie Neighbours, MD   400 Units at 04/30/19 0841    aspirin chewable tablet 81 mg  81 mg Oral Daily Callie Neighbours, MD   81 mg at 04/30/19 0837    insulin glargine (LANTUS) injection vial 24 Units  24 Units Subcutaneous Nightly Callie Neighbours, MD        furosemide (LASIX) injection 40 mg  40 mg Intravenous BID Luciano Cabrera MD         Review of Systems:   · Constitutional: No Fever or Weight Loss   · Eyes: No Decreased Vision  · ENT: No Headaches, Hearing Loss or Vertigo  · Cardiovascular: As per HPI  · Respiratory: As per HPI  · Gastrointestinal: No abdominal pain, appetite loss, blood in stools, constipation, diarrhea or heartburn  · Genitourinary: No dysuria, trouble voiding, or hematuria  · Musculoskeletal:  No gait disturbance, weakness or joint complaints  · Integumentary: No rash or pruritis  · Neurological: No TIA or stroke symptoms  · Psychiatric: No anxiety or depression  · Endocrine: No malaise, fatigue or temperature intolerance  · Hematologic/Lymphatic: No bleeding problems, blood clots or swollen lymph nodes  · Allergic/Immunologic: No nasal congestion or hives  All systems negative except as marked. Physical Examination:    Vitals:    04/30/19 0445 04/30/19 0929 04/30/19 1143 04/30/19 1436   BP: (!) 157/63 121/62 (!) 132/57 136/60   Pulse: 77 103 88 104   Resp: 22 22 18 27   Temp: 97.7 °F (36.5 °C) 97.7 °F (36.5 °C)  98.4 °F (36.9 °C)   TempSrc: Oral Oral  Oral   SpO2:  96% 93% 97%   Weight:       Height:           General Appearance:  No distress, conversant    Constitutional:  Well developed, Well nourished, No acute distress, Non-toxic appearance. HENT:  Normocephalic, Atraumatic, Bilateral external ears normal, Oropharynx moist, No oral exudates, Nose normal. Neck- Normal range of motion, No tenderness, Supple, No stridor,no apical-carotid delay  Lymphatics : no palpable lymph nodes  Eyes:  PERRL, EOMI, Conjunctiva normal, No discharge. Respiratory:  Normal breath sounds, No respiratory distress, No wheezing, No chest tenderness. ,no use of accessory muscles, crackles Absent   Cardiovascular: (PMI) apex non displaced,no lifts no thrills, ankle swelling Absent  , 1+, s1 and s2 audible,Murmur. Absent , JVD not noted    Abdomen /GI:  Bowel sounds

## 2019-04-30 NOTE — CARE COORDINATION
.CM met with pt for d/c planning. Introduced self and updated white board. Pt lives with spouse and is independent with ADL's. Pt drives, has a PCP, has insurance, and is able to afford medication. Pt denies having any DME or services. [facility] offered and pt refused. Pt denies any needs at this time. D/c plan is home with spouse, no needs. CM will continue to follow.   TE

## 2019-04-30 NOTE — ED NOTES
hospitalist at bedside evaluating patient     Luciana Maria Fernanda, [facility]  04/30/19 5217

## 2019-04-30 NOTE — ED NOTES
[id number] assigned/clean @ [address] notified.      Shruthi John  04/30/19 1893

## 2019-04-30 NOTE — H&P
History and Physical      Name:  Estephania Ribera /Age/Sex: 1950  (70 y.o. male)   MRN & CSN:  [id number] & [id number] Admission Date/Time: 2019  2:20 AM   Location:  ED30/ED-30 PCP: Ailyn Billy MD       Hospital Day: 1    Assessment and Plan:   Estephania Ribera is a 70 y.o.  male  who presents with Acute on chronic systolic CHF (congestive heart failure) ([facility]    1. Acute on Chronic Systolic Congestive Heart Failure: Echo with EF 30%. BNP 6600. Start Lasix IV. Continue Strict I and O. Daily weight. Repeat Echo. Cardiology on consult  2. Coronary Artery Disease: S/P PCI. Continue ASA, BB,  and Statin   3. Acute Kidney Injury/Chronic Kidney Disease Stage 3 ?:  Creatinine 3.2. Avoid nephrotoxic agents  4. Elevated Troponin: no chest pain. Will trend. 5. Type 2 DM: Last A1C. Lantus, Sliding scale. Hypoglycemia protocol. Accucheck. Hold oral hypoglycemic agents for now  6. Hyperlipidemia: continue statin    Diet No diet orders on file   DVT Prophylaxis [] Lovenox, [x]  Heparin, [] SCDs, [] Warfarin  [] NOAC     GI Prophylaxis [] PPI,  [x] H2 Blocker,  [] Carafate,  [x] Diet/Tube Feeds   Code Status No Order   MDM [] Low, [] Moderate,[x]  High     History of Present Illness:     Chief Complaint: Acute on chronic systolic CHF (congestive heart failure) ([facility]  Estephania Ribera is a 70 y.o.  male, with past medical history significant for CAD S/p PCI, CHF, HTN, HLD, who presented to the ED from home due SOB. The present condition started night PTA as SOB worse with laying flat, exertional. denied chest pain or PND. Has leg swelling but not worsening. Denied fever or cough. The patient was brought to the ED and was subsequently admitted.     Ten point ROS reviewed negative, unless as noted above    Objective:   No intake or output data in the 24 hours ending 19 0400   Vitals:   Vitals:    19 0332   BP: (!) 149/87   Pulse: 70   Resp: 20   Temp:    SpO2: 96%     Physical Exam:   GEN Awake male, sitting upright in bed in no apparent distress. Appears given age. EYES Pupils are equally round. No scleral erythema, discharge, or conjunctivitis. HENT Mucous membranes are moist. Oral pharynx without exudates, no evidence of thrush. NECK Supple, no apparent thyromegaly or masses. RESP + cracles, bibasal  CARDIO/VASC S1/S2 auscultated. Regular rate without appreciable murmurs, rubs, or gallops. No JVD or carotid bruits. Peripheral pulses equal bilaterally and palpable. +ve peripheral edema. GI Abdomen is soft without significant tenderness, masses, or guarding. Bowel sounds are normoactive. Rectal exam deferred.  No costovertebral angle tenderness. Normal appearing external genitalia. Crandall catheter is not present. HEME/LYMPH No palpable cervical lymphadenopathy and no hepatosplenomegaly. No petechiae or ecchymoses. MSK No gross joint deformities. SKIN Normal coloration, warm, dry. NEURO Cranial nerves appear grossly intact, normal speech, no lateralizing weakness. PSYCH Awake, alert, oriented x 4. Affect appropriate. Past Medical History:      Past Medical History:   Diagnosis Date    CAD (coronary artery disease)     Cardiomyopathy ([facility] [address]     EF 27%    Diabetes mellitus ([facility] [address]     Gout     [facility] of cardiovascular [facility]14[facility]    [facility]    History of echocardiogram 01/12/2018    EF 25% [facility]    Hyperlipidemia     Hypertension      PSHX:  has a past surgical history that includes Cardiac surgery and Cataract removal.    Allergies: Allergies   Allergen Reactions    Penicillins Rash    Tetracyclines & Related Rash       FAM HX: family history includes Cancer in his mother; Diabetes in his father; Heart Disease in his father; Kidney Disease in his father.   Soc HX:   Social History     Socioeconomic History    Marital status:      Spouse name: None    Number of children: None    Years of education: None    Highest education level: None MG tablet Take 2 tablets by mouth daily 1/9/19  Yes Tate Momin MD   allopurinol (ZYLOPRIM) 100 MG tablet Take 100 mg by mouth 2 times daily    Yes Historical Provider, MD   magnesium chloride (MAG DELAY 64) 535 (64 MG) MG TBCR CR tablet Take 64 mg by mouth daily. Yes Historical Provider, MD   vitamin E 400 UNIT capsule Take 400 Units by mouth daily. Yes Historical Provider, MD   aspirin 81 MG tablet Take 81 mg by mouth daily. Yes Historical Provider, MD     Medications:    furosemide  20 mg Intravenous Once      Infusions:   PRN Meds:      Recent Labs     04/30/19 0258   WBC 9.9   HGB 12.6*   HCT 39.1*         Recent Labs     04/30/19 0258      K 4.2      CO2 20*   *   CREATININE 3.2*     Recent Labs     04/30/19 0258   AST 40*   ALT 43*   BILITOT 0.3   ALKPHOS 169*     No results for input(s): INR in the last 72 hours.   Recent Labs     04/30/19 0258   TROPONINT 0.350*        Imaging reviewed      Electronically signed by Pankaj Waggoner MD on 4/30/2019 at 4:00 AM

## 2019-05-01 PROBLEM — I50.23 ACUTE ON CHRONIC SYSTOLIC CHF (CONGESTIVE HEART FAILURE) (HCC): Status: RESOLVED | Noted: 2019-01-01 | Resolved: 2019-01-01

## 2019-05-01 NOTE — DISCHARGE INSTR - OTHER ORDERS
Follow up with Dr Ciara Middleton on Friday May 3 at 9:00. Follow up with Dr Kalpana Hernandez on Wed. May 15 at 1:15.

## 2019-05-01 NOTE — CARE COORDINATION
CHF Simulation Cart Utilized:    CHF Book provided (Learning to Live with Heart failure):  [x] Yes  [] No     CHF zones/weighing daily:  [x] Yes  [] No       Teachback Done  [x] Yes  [] No     Type of Heart Failure/EF review:  [x] Yes  [] No     Contributing factor review:  [x] Yes  [] No     Medication review:            EF: 15-20%  []  ACEARNI (LV<40%)  [] ARB (If EF <40% and ACE not tolerated)  [] ARNI (LV<40%)  May require prior auth        [] Free coupon provided if newly prescibed  [x] BB (If EF <40% Use only Carvedilol, metoprolol succinate or bisoprolol)  [x] Diuretic  [] Vasodilators (If self identified  and LV <40%)  [] Hydralazine (If self identified  and LV <40%)  [] Aldosterone blockers  (If LV <40%, Cr <2.5 mg/dl in men,<2.0 mg/dl in women)  []  Anticougulant ( AFib/Flutter)         [] No Contraindicated   [] Other:     Low sodium diet:  [x] Why should I limit sodium  [x] Low sodium food list:  [x] Foods to avoid  [x] Hidden sources of sodium  [x] Reading a food label   [x] Low sodium cookbook provided    Proper use of digital scale:  [x] Yes  [] No   I provided the patient with a scale:  [] Yes  [x] No (Patient has a functioning scale at home)    Exercise/Cardiac Rehab review:  [x] Yes  [] No   [x] Cardiac rehab referral sent    Smoking Cessation:  [] Yes  [x] No   [] [facility] referral sent    Interactive Notebook review: (www. [email or web address])  [x] Yes  [] No     Pacemaker/ Lifevest/ ICD review:  [] Yes  [x] No     Teach back utilized  [x] Yes  [] No     Date: 19         Time spent educatin minutes  CN provided Teaching following the Heart Failure book, the HF Zones, and the Sodium Content pamphlet. We reviewed the HF zones, signs and symptoms to report on day 1 of onset, medication compliance, daily weights and low sodium diet . The patient's contributing risk factors for heart failure are identified as:   Advised patient you can reduce the risk for heart failure exacerbations by modifying/controlling the risk factors they have. Pt informed to take medications as prescribed, follow a cardiac heart healthy / low sodium diet and exercise regularly. Patient follows a renal diet at home and limits sodium to 1600 ml/day. Patient is compliant and careful with how he cares for himself at home. He does weigh daily and will follow the CHF zone worksheet. CN will follow and be available if needs arise.

## 2019-05-01 NOTE — DISCHARGE SUMMARY
Discharge Summary    Name:  Hellen Douglass /Age/Sex: 1950  (70 y.o. male)   MRN & CSN:  [id number] & [id number] Admission Date/Time: 2019  2:20 AM   Attending:  Dominique Cortez MD Discharging Physician: Dominique Cortez MD     HPI:     As per admission H&P    \"Manjit Perrin is a 70 y.o.  male, with past medical history significant for CAD S/p PCI, CHF, HTN, HLD, who presented to the ED from home due SOB. The present condition started night PTA as SOB worse with laying flat, exertional. denied chest pain or PND. Has leg swelling but not worsening. Denied fever or cough. The patient was brought to the ED and was subsequently admitted\". Hospital Course:   Hellen Douglass is a 70 y.o.  male  who presents with Acute on chronic systolic CHF (congestive heart failure) (HCC)    Acute on Chronic Systolic Congestive Heart Failure:  Echo with EF 30%. BNP 6600. Evaluated by cardiology  Treated with IV lasix, symptoms improved  His Home PO  lasix dose was increased on discharge    Coronary Artery Disease: S/P PCI. Mildly elevated Trop without chest pain  AMI Type II  Cardiology recommended Nuclear stress test  Patient refused due to concern for his renal function, he wanted to discuss it with his nephrologist first  Patient also has appointment on Friday with his Cardiologist Dr. Angie Kaye. Continue ASA, BB,  and Statin   Follow up as outpatient    Acute Kidney Injury/Chronic Kidney Disease Stage 3 seems at baseline    Type 2 DM:   Resume home meds    Hyperlipidemia: continue statin      The patient expressed appropriate understanding of and agreement with the discharge recommendations, medications, and plan.      Consults this admission:  IP CONSULT TO HOSPITALIST  IP CONSULT TO HEART FAILURE NURSE/COORDINATOR  IP CONSULT TO DIETITIAN  IP CONSULT TO CARDIOLOGY    Discharge Instruction:   Follow up appointments:   Primary care physician:  within 2 weeks    Diet:  Renal diet  Activity: activity as tolerated  Disposition: Discharged to:   [x]Home, [][facility], []SNF, []Acute Rehab, []Hospice   Condition on discharge: Stable    Discharge Medications:      John Kirk   Home Medication Instructions DELMY:[id number]    Printed on:05/01/19 1039   Medication Information                      allopurinol (ZYLOPRIM) 100 MG tablet  Take 100 mg by mouth 2 times daily              aspirin 81 MG tablet  Take 81 mg by mouth daily. calcitRIOL (ROCALTROL) 0.25 MCG capsule  Take 0.25 mcg by mouth daily             Cholecalciferol (VITAMIN D3 PO)  Take 125 mcg by mouth             furosemide (LASIX) 40 MG tablet  Take 1 tablet by mouth 2 times daily             insulin aspart (NOVOLOG) 100 UNIT/ML injection vial  Inject into the skin 3 times daily (before meals) Sliding scale             Insulin Degludec (TRESIBA FLEXTOUCH) 100 UNIT/ML SOPN  Inject 24 Units into the skin             isosorbide mononitrate (IMDUR) 30 MG extended release tablet  Take 1 tablet by mouth daily             magnesium chloride (MAG DELAY 64) 535 (64 MG) MG TBCR CR tablet  Take 64 mg by mouth daily. metoprolol succinate (TOPROL XL) 25 MG extended release tablet  Take 1 tablet by mouth daily             pravastatin (PRAVACHOL) 10 MG tablet  Take 10 mg by mouth daily             vitamin E 400 UNIT capsule  Take 400 Units by mouth daily. Objective Findings at Discharge:   BP (!) 131/98   Pulse 60   Temp 97.9 °F (36.6 °C) (Oral)   Resp 15   Ht 5' 10\" (1.778 m)   Wt 205 lb (93 kg)   SpO2 98%   BMI 29.41 kg/m²            PHYSICAL EXAM   GEN Awake male, sitting upright in bed in no apparent distress. RESP Clear to auscultation, no wheezes, rales or rhonchi. Symmetric chest movement while on room air. CARDIO/VASC S1/S2 auscultated. Regular rate without appreciable murmurs, rubs, or gallops. trace peripheral edema. GI Abdomen is soft without significant tenderness, masses, or guarding.  Bowel sounds are normoactive. MSK No gross joint deformities. Spontaneous movement of all extremities  SKIN Normal coloration, warm, dry. NEURO Cranial nerves appear grossly intact, normal speech, no lateralizing weakness. PSYCH Awake, alert, oriented x 4. Affect appropriate. BMP/CBC  Recent Labs     04/30/19  0258 04/30/19  0608 05/01/19  0133    140 138   K 4.2 4.5 3.9    107 106   CO2 20* 19* 17*   * 105* 107*   CREATININE 3.2* 3.1* 3.1*   WBC 9.9  --  7.9   HCT 39.1*  --  37.1*     --  157       IMAGING:      XR CHEST PORTABLE [[id number]]Collected: 04/30/19 0329  Order Status: CompletedUpdated: 04/30/19 0335  Narrative:    EXAMINATION:  SINGLE XRAY VIEW OF THE CHEST    4/30/2019 2:50 am    COMPARISON:  None. HISTORY:  ORDERING SYSTEM PROVIDED HISTORY: dyspnea  TECHNOLOGIST PROVIDED HISTORY:  Reason for exam:->dyspnea  Ordering Physician Provided Reason for Exam: dyspnea  Acuity: Acute  Type of Exam: Initial    FINDINGS:  The lungs are underinflated, resulting in vascular crowding and subsegmental  atelectasis. There is a small left pleural effusion. No evidence of focal  consolidation or pneumothorax. The cardiac silhouette is not enlarged. A  dual lead pacer is in place. Healed left-sided rib fractures. No acute bony  abnormality. Impression:    1. Low lung volumes limit evaluation. Bibasilar airspace opacities likely  reflect atelectasis. 2. Small left pleural effusion.      Discharge Time of 35 minutes    Electronically signed by Nat Cardoso MD on 5/1/2019 at 10:34 AM

## 2019-05-01 NOTE — PROGRESS NOTES
history:  family history includes Cancer in his mother; Diabetes in his father; Heart Disease in his father; Kidney Disease in his father. Allergies   Allergen Reactions    Penicillins Rash    Tetracyclines & Related Rash       Review of Systems:    All 14 systems were reviewed and are negative  Except for the positive findings  which as documented     BP (!) 110/58   Pulse 60   Temp 99.4 °F (37.4 °C) (Oral)   Resp 15   Ht 5' 10\" (1.778 m)   Wt 205 lb (93 kg)   SpO2 98%   BMI 29.41 kg/m²       Intake/Output Summary (Last 24 hours) at 5/1/2019 0710  Last data filed at 5/1/2019 0147  Gross per 24 hour   Intake 5 ml   Output 4202 ml   Net -4197 ml     Physical Exam:  Constitutional:  Well developed, Well nourished, No acute distress, Non-toxic appearance. HENT:  Normocephalic, Atraumatic, Bilateral external ears normal, Oropharynx moist, No oral exudates, Nose normal. Neck- Normal range of motion, No tenderness, Supple, No stridor. Eyes:  PERRL, EOMI, Conjunctiva normal, No discharge. Respiratory:  Normal breath sounds, No respiratory distress, No wheezing, No chest tenderness. Cardiovascular:  Normal heart rate, Normal rhythm, No murmurs, No rubs, No gallops, JVP not elevated  Abdomen/GI:  Bowel sounds normal, Soft, No tenderness, No masses, No pulsatile masses. Musculoskeletal:  Intact distal pulses, No edema, No tenderness, No cyanosis, No clubbing. Good range of motion in all major joints. No tenderness to palpation or major deformities noted. Back- No tenderness. Integument:  Warm, Dry, No erythema, No rash. Lymphatic:  No lymphadenopathy noted. Neurologic:  Alert & oriented x 3, Normal motor function, Normal sensory function, No focal deficits noted.    Psychiatric:  Affect  and  Mood :no change    Medications:    sodium chloride flush  10 mL Intravenous 2 times per day    famotidine  20 mg Oral Daily    heparin (porcine)  5,000 Units Subcutaneous 3 times per day    allopurinol

## 2019-05-03 NOTE — PATIENT INSTRUCTIONS
Please hold on to these instructions the  will call you within 1-9 business days when we receive authorization from your insurance. Nuclear Stress Test    WHAT TO EXPECT:   ? You will need to confirm the test or it could be cancelled. ? This test will take approximately 2 hours: 1 hour in the AM &    1 hour in the PM. You will be given a time by the [facility] after the first  part is completed to come back. ? You will be given a medication, through an IV in the hand, this will safely simulate exercise. This IV is also needed to inject the radioactive isotope. ? You will receive an injection in the AM & PM before the pictures. ? Using a special camera, you will have one set of pictures of your heart taken in the AM and a set of pictures in the PM.     PREPARATION FOR TEST:  ? Eat a light breakfast such as juice and toast.  ? If you are DIABETIC: Eat a normal breakfast with NO CAFFEINE and take your insulin as normal.   ? AVOID ALL FOODS & DRINKS containing CAFFEINE 24 HOURS PRIOR TO THE TEST: Including coffee, Tea, Dedrick and other soft drinks even those labeled  caffeine free or decaffeinated.  ? HOLD THESE MEDICATIONS Persantine & Theophylline (Theodur)  24 hours prior & bring your inhaler with you. ?  The physician will specify if the following Beta blockers need to be held for 24 hours prior to test: Toprol XL (metoprolol ER)
[id number]

## 2019-05-03 NOTE — PROGRESS NOTES
5/3/2019    RE: Jesus Weber  (1950)                               TO:  Dr. Kelly Taylor MD                CHIEF COMPLAINT   Todd Stephens is a 70 y.o. male who was seen today for management of  cad                                    HPI:   Patient is here for    - Coronary artery disease, does not have chest pain. Patient is  compliant with prescribed medicines. - Hyperlipidimea, lipids are in acceptable range. Pt  is  compliant with medicines  - Diabetes mellitus, blood glucose level is  well controlled. Pt is compliant with meds and diet    - ICD  Complaint with checks  - Low EF  Complaint with meds                The patient  does not have   cardiac complain  eas in hospital with CHF    Jesus Weber has the following history recorded in care path: There are no active problems to display for this patient.     Current Outpatient Medications   Medication Sig Dispense Refill    potassium chloride (KLOR-CON) 20 MEQ packet Take 20 mEq by mouth daily      isosorbide mononitrate (IMDUR) 30 MG extended release tablet Take 1 tablet by mouth daily 30 tablet 3    furosemide (LASIX) 40 MG tablet Take 1 tablet by mouth 2 times daily 60 tablet 3    metoprolol succinate (TOPROL XL) 25 MG extended release tablet Take 1 tablet by mouth daily 30 tablet 5    calcitRIOL (ROCALTROL) 0.25 MCG capsule Take 0.25 mcg by mouth daily      pravastatin (PRAVACHOL) 10 MG tablet Take 10 mg by mouth daily      Cholecalciferol (VITAMIN D3 PO) Take 125 mcg by mouth      Insulin Degludec (TRESIBA FLEXTOUCH) 100 UNIT/ML SOPN Inject 24 Units into the skin      insulin aspart (NOVOLOG) 100 UNIT/ML injection vial Inject into the skin 3 times daily (before meals) Sliding scale      allopurinol memory loss, numbness/tingling, visual changes, syncope  Dermatological: Negative for rash    Objective:  /72   Pulse 80   Ht 5' 10\" (1.778 m)   Wt 209 lb (94.8 kg)   BMI 29.99 kg/m²   Wt Readings from Last 3 Encounters:   05/03/19 209 lb (94.8 kg)   04/30/19 205 lb (93 kg)   01/09/19 213 lb (96.6 kg)     Body mass index is 29.99 kg/m². GENERAL - Alert, oriented, pleasant, in no apparent distress. EYES: No jaundice, no conjunctival pallor. SKIN: It is warm & dry. No rashes. No Echhymosis    HEENT - No clinically significant abnormalities seen. Neck - Supple. No jugular venous distention noted. No carotid bruits. Cardiovascular - Normal S1 and S2 without obvious murmur or gallop. Extremities - No cyanosis, clubbing, or significant edema. Pulmonary - No respiratory distress. No wheezes or rales. Abdomen - No masses, tenderness, or organomegaly. Musculoskeletal - No significant edema. No joint deformities. No muscle wasting. Neurologic - Cranial nerves II through XII are grossly intact. There were no gross focal neurologic abnormalities.     Lab Review   Lab Results   Component Value Date    TROPONINT 0.338 04/30/2019     BNP:  No results found for: BNP  PT/INR:  No results found for: INR  No results found for: LABA1C  Lab Results   Component Value Date    WBC 7.9 05/01/2019    HCT 37.1 (L) 05/01/2019    MCV 93.0 05/01/2019     05/01/2019     Lab Results   Component Value Date    CHOL 94 05/01/2019    TRIG 60 05/01/2019    HDL 43 05/01/2019    LDLDIRECT 47 05/01/2019     Lab Results   Component Value Date    ALT 43 (H) 04/30/2019    AST 40 (H) 04/30/2019     BMP:    Lab Results   Component Value Date     05/01/2019    K 3.9 05/01/2019     05/01/2019    CO2 17 05/01/2019     05/01/2019    CREATININE 3.1 05/01/2019     CMP:   Lab Results   Component Value Date     05/01/2019    K 3.9 05/01/2019     05/01/2019    CO2 17 05/01/2019     05/01/2019 PROT 6.7 04/30/2019     TSH:    Lab Results   Component Value Date    TSHHS 2.920 04/30/2019           Impression:    1. ICD (implantable cardioverter-defibrillator), dual, in situ    2. Ischemic cardiomyopathy       Patient Active Problem List   Diagnosis Code   (none) - all problems resolved or deleted       Assessment & Plan:    - EF 22%  Has ICD on meds  CPM    - ICD  Has biotronic          -   DIABETES MELLITUS: Available pertinent lab data reviewed   and  patient was given dietary advice . Advised to check blood glucose level on a regular basis. -   Changes  in medicines made: No                -     CORONARY ARTERY DISEASE:  asymptomatic     All available  tests in chart reviewed. Management discussed . Testing ordered  no       RCA stent  , Cx and LAD    2011    No  more issues     CPM     Trop was positive, lexiscn              -  LIPID MANAGEMENT:  Available lipid  lab data reviewed  and patient was given dietary advice. NCEP- ATP III guidelines reviewed with patient.     -   Changes  in medicines made: No        -    - CKD     Per renal     Pt very concerned                        Seema Pride MA  [address] [facility]

## 2019-05-06 NOTE — TELEPHONE ENCOUNTER
Called patient to schedule NM Stress Test, no answer. Left message for patient to call office to schedule.  [facility] NO AUTH NEEDED OK TO SCHEDULE
Satisfactory

## 2019-05-31 NOTE — TELEPHONE ENCOUNTER
Patient needs the metoprolol xl that was sent to [facility] sent to [facility].

## 2019-05-31 NOTE — PROGRESS NOTES
5/31/2019    RE: Hailey Corea  (1950)                               TO:  Dr. Jolie Dillard MD                CHIEF COMPLAINT   Best Quintanilla is a 70 y.o. male who was seen today for management of  cad                      Here for FU on cardiolite              HPI:   Patient is here for    - Coronary artery disease, does not have chest pain. Patient is  compliant with prescribed medicines. - Hyperlipidimea, lipids are in acceptable range. Pt  is  compliant with medicines  - Diabetes mellitus, blood glucose level is  well controlled.  Pt is compliant with meds and diet    - ICD  Complaint with checks  - Low EF  Complaint with meds                The patient  does not have   cardiac complain      Hailey Corea has the following history recorded in care path:  Patient Active Problem List    Diagnosis Date Noted    Abnormal EKG     Hx of left bundle branch block     Family history of coronary artery disease      Current Outpatient Medications   Medication Sig Dispense Refill    potassium chloride (KLOR-CON) 20 MEQ packet Take 20 mEq by mouth daily      metoprolol succinate (TOPROL XL) 25 MG extended release tablet Take 2 tablets by mouth daily 30 tablet 5    isosorbide mononitrate (IMDUR) 30 MG extended release tablet Take 1 tablet by mouth daily 90 tablet 3    furosemide (LASIX) 40 MG tablet Take 1 tablet by mouth 2 times daily 60 tablet 3    calcitRIOL (ROCALTROL) 0.25 MCG capsule Take 0.25 mcg by mouth daily      pravastatin (PRAVACHOL) 10 MG tablet Take 10 mg by mouth daily      Cholecalciferol (VITAMIN D3 PO) Take 125 mcg by mouth      Insulin Degludec (TRESIBA FLEXTOUCH) 100 UNIT/ML SOPN Inject 24 Units into the skin      insulin aspart (NOVOLOG) 100 Negative for abdominal pain or heartburn  Genito-Urinary: Negative for urinary frequency/urgency  Musculoskeletal: Negative for muscle pain, muscular weakness, negative for pain in arm and leg or swelling in foot and leg  Neurological: Negative for dizziness, headaches, memory loss, numbness/tingling, visual changes, syncope  Dermatological: Negative for rash    Objective:  /62 (Site: Left Upper Arm, Position: Sitting, Cuff Size: Medium Adult)   Pulse 74   Resp 12   Ht 5' 10\" (1.778 m)   Wt 210 lb (95.3 kg)   SpO2 98%   BMI 30.13 kg/m²   Wt Readings from Last 3 Encounters:   05/31/19 210 lb (95.3 kg)   05/03/19 209 lb (94.8 kg)   04/30/19 205 lb (93 kg)     Body mass index is 30.13 kg/m². GENERAL - Alert, oriented, pleasant, in no apparent distress. EYES: No jaundice, no conjunctival pallor. SKIN: It is warm & dry. No rashes. No Echhymosis    HEENT - No clinically significant abnormalities seen. Neck - Supple. No jugular venous distention noted. No carotid bruits. Cardiovascular - Normal S1 and S2 without obvious murmur or gallop. Extremities - No cyanosis, clubbing, or significant edema. Pulmonary - No respiratory distress. No wheezes or rales. Abdomen - No masses, tenderness, or organomegaly. Musculoskeletal - No significant edema. No joint deformities. No muscle wasting. Neurologic - Cranial nerves II through XII are grossly intact. There were no gross focal neurologic abnormalities.     Lab Review   Lab Results   Component Value Date    TROPONINT 0.338 04/30/2019     BNP:  No results found for: BNP  PT/INR:  No results found for: INR  No results found for: LABA1C  Lab Results   Component Value Date    WBC 7.9 05/01/2019    HCT 37.1 (L) 05/01/2019    MCV 93.0 05/01/2019     05/01/2019     Lab Results   Component Value Date    CHOL 94 05/01/2019    TRIG 60 05/01/2019    HDL 43 05/01/2019    LDLDIRECT 47 05/01/2019     Lab Results   Component Value Date    ALT 43 (H) 04/30/2019 AST 40 (H) 04/30/2019     BMP:    Lab Results   Component Value Date     05/01/2019    K 3.9 05/01/2019     05/01/2019    CO2 17 05/01/2019     05/01/2019    CREATININE 3.1 05/01/2019     CMP:   Lab Results   Component Value Date     05/01/2019    K 3.9 05/01/2019     05/01/2019    CO2 17 05/01/2019     05/01/2019    PROT 6.7 04/30/2019     TSH:    Lab Results   Component Value Date    TSHHS 2.920 04/30/2019           Impression:    No diagnosis found. Patient Active Problem List   Diagnosis Code    Abnormal EKG R94.31    Hx of left bundle branch block Z86.79    Family history of coronary artery disease Z82.49       Assessment & Plan:    - EF 22%   CPM,      - ICD  Has biotronic  Check reviewed          -   DIABETES MELLITUS: Available pertinent lab data reviewed   and  patient was given dietary advice . Advised to check blood glucose level on a regular basis. -   Changes  in medicines made: No                -     CORONARY ARTERY DISEASE:  asymptomatic     All available  tests in chart reviewed. Management discussed . Testing ordered  no       RCA stent  , Cx and LAD    2011    No  more issues                  -  LIPID MANAGEMENT:  Available lipid  lab data reviewed  and patient was given dietary advice. NCEP- ATP III guidelines reviewed with patient.     -   Changes  in medicines made: No            - CKD     Per renal                            Sandhya Johnstno MA  [facility]

## 2019-06-05 NOTE — TELEPHONE ENCOUNTER
Patient just started Imdur has been on it for about 2 weeks .  Noticed that his heart rate is elevated , Has never had that problem before [place]

## 2019-06-05 NOTE — TELEPHONE ENCOUNTER
Patient reports on imur 3 weeks ago, hear trate is usually 60-70, HR now in 80's, twice in 90s. Patient has not problems other that shoulder pain when he stretches.

## 2019-07-30 NOTE — TELEPHONE ENCOUNTER
B/P running /58-60 hr 100-102   Patient taking Metoprolol tart 25 once daily and Lasix 40mg in AM, 20mg im PM. Renal wants b/p at 130-140. Patient asking if he needs these meds.  Will discuss with dr Al Carmen in AM. Patient notes he tolerated 6.25 Coreg in the past

## 2019-08-08 PROBLEM — Z45.02 ICD (IMPLANTABLE CARDIOVERTER-DEFIBRILLATOR) BATTERY DEPLETION: Status: ACTIVE | Noted: 2019-01-01

## 2019-08-08 NOTE — PROGRESS NOTES
Pt here in office & educated on [facility] for Dx: EOL, scheduled for 8/30/19 @ 9, w/arrival @ 7, @ [facility]; risks explained; & consents signed. Pre-admission orders given to pt for labs & CXR, which are due 8/28/19 @ [address]. Instructions given to pt to:  NPO after midnight night before procedure; Call hospital @ [phone] to pre-register; May take rest of morning meds. am of procedure; & pt voiced understanding. Copies of consent, pre-testing orders, & info. sheet given to Yaneth.

## 2019-08-08 NOTE — PROGRESS NOTES
Taking? Authorizing Provider   mupirocin (BACTROBAN NASAL) 2 % nasal ointment by Nasal route 2 times daily Take by Nasal route 2 times daily. for 5 days prior to the procedure. 8/8/19  Yes Twyla Wilson MD   chlorhexidine gluconate (ANTISEPTIC SKIN CLEANSER) 4 % SOLN external solution Apply topically daily as needed (PRIOR TO PROCEDURE) Use this solution to scrub the area and take a shower 48 hours prior to the procedure and in the morning of procedure. 8/8/19  Yes Twyla Wilson MD   carvedilol (COREG) 3.125 MG tablet Take 1 tablet by mouth 2 times daily (with meals) 7/31/19  Yes Rigo Chin MD   potassium chloride (KLOR-CON) 20 MEQ packet Take 20 mEq by mouth daily   Yes Historical Provider, MD   isosorbide mononitrate (IMDUR) 30 MG extended release tablet Take 1 tablet by mouth daily 5/3/19  Yes Rigo Chin MD   furosemide (LASIX) 40 MG tablet Take 1 tablet by mouth 2 times daily  Patient taking differently: Take 40 mg by mouth 2 times daily Indications: pt takes 40mg in AM and 20mg in PM per renal  5/1/19  Yes Shana Bennett MD   calcitRIOL (ROCALTROL) 0.25 MCG capsule Take 0.25 mcg by mouth daily   Yes Historical Provider, MD   pravastatin (PRAVACHOL) 10 MG tablet Take 10 mg by mouth daily   Yes Historical Provider, MD   Cholecalciferol (VITAMIN D3 PO) Take 125 mcg by mouth   Yes Historical Provider, MD   Insulin Degludec (TRESIBA FLEXTOUCH) 100 UNIT/ML SOPN Inject 24 Units into the skin   Yes Historical Provider, MD   insulin aspart (NOVOLOG) 100 UNIT/ML injection vial Inject into the skin 3 times daily (before meals) Sliding scale   Yes Historical Provider, MD   allopurinol (ZYLOPRIM) 100 MG tablet Take 100 mg by mouth 2 times daily    Yes Historical Provider, MD   magnesium chloride (MAG DELAY 64) 535 (64 MG) MG TBCR CR tablet Take 64 mg by mouth daily. Yes Historical Provider, MD   vitamin E 400 UNIT capsule Take 400 Units by mouth daily.    Yes Historical Provider, MD   aspirin 81 MG tablet Take 81 mg produced using speech recognition software and may contain errors related to that system including errors in grammar, punctuation, and spelling, as well as words and phrases that may be inappropriate. If there are any questions or concerns please feel free to contact the dictating provider for clarification.

## 2019-08-24 PROBLEM — I50.9 HEART FAILURE EXACERBATED BY SOTALOL (HCC): Status: ACTIVE | Noted: 2019-01-01

## 2019-08-24 NOTE — CONSULTS
Nephrology Service Consultation        [address], [address] [address]  Phone: [phone]  Office Hours: 8:30AM - 4:30PM  Monday - Friday           Patient:  Daija Martines  MRN: [id number]  Consulting physician:  Emerald Tafoya MD  Reason for Consult: elevated cr      PCP: Geovanni Meek MD    HISTORY OF PRESENT ILLNESS:   The patient is a 70 y.o. male with chf, ckd4 from polycystic kidney dz presented due to increased weight gain despite the increase in his lasix dose to 40mg po bid. He also reports some orthopnea and has had to sit in a chair to sleep. Denies overdrinking recently or increased salt intake. Renal consult for cr 3.4 on admission. He has a nephrologist at [facility] that he follows with  He is on room air, sitting in a chair  uop overnight not collected    REVIEW OF SYSTEMS:  14 point ROS is Negative. See positive ROS per HPI    Past Medical History:        Diagnosis Date    Abnormal EKG     CAD (coronary artery disease)     Cardiomyopathy (HCC)     EF 27%    Diabetes mellitus ([address]     Family history of coronary artery disease     Gout     H/O cardiovascular stress test 05/16/2019    EF 22% abn study    H/O echocardiogram 04/30/2019    EF 20-25% mod phtn, mild aortic insufficiency, mild MR and TR    History of cardiovascular stress test 01/14/2018    EF27% - [facility]    History of echocardiogram 01/12/2018    EF 25% Trace [facility] -[facility]    Hx of left bundle branch block     Hyperlipidemia     Hypertension     ICD (implantable cardioverter-defibrillator) battery depletion 8/8/2019    Biotronik device implanted in here 2000 and [facility]       Past Surgical History:        Procedure Laterality Date    CATARACT REMOVAL      PTCA      4 stents       Medications:   Prior to Admission medications    Medication Sig Start Date End Date Taking?  Authorizing Provider   mupirocin (BACTROBAN NASAL) 2 % nasal ointment by Nasal route 2

## 2019-08-24 NOTE — CONSULTS
CARDIOLOGY CONSULT NOTE   Reason for consultation:  R/o chf    Referring physician:  Tiara Tran MD     Primary care physician: Eric Kamara MD      Dear Dr. Bill Toledo  Thanks for the consult. History of present illness:Manjit is a 70 y. o.year old who  presents with for shortness of breath which is moderate for many weeks, intermittent, self limiting, not associated with cough or fever, gets worse with activity and better with rest,  Patietn has ischemic cardiomyopathy, LVEF 20-25% AND ICD is LEOLA, his creatinine is also elevated, he is delaying his ICD batter change and his creatinue is not getting better  Blood pressure, cholesterol, blood glucose and weight are well controlled. Past medical history:    has a past medical history of Abnormal EKG, CAD (coronary artery disease), Cardiomyopathy ([address] Diabetes mellitus ([facility] [address] Family history of coronary artery disease, Gout, H/O cardiovascular stress test, H/O echocardiogram, History of cardiovascular stress test, History of echocardiogram, Hx of left bundle branch block, Hyperlipidemia, Hypertension, and ICD (implantable cardioverter-defibrillator) battery depletion. Past surgical history:   has a past surgical history that includes Cataract removal and Percutaneous Transluminal Coronary Angio. Social History:   reports that he has quit smoking. His smoking use included cigarettes. He quit after 11.00 years of use. He has quit using smokeless tobacco. He reports that he does not drink alcohol or use drugs.   Family history:   no family history of CAD, STROKE of DM    Allergies   Allergen Reactions    Penicillins Rash    Tetracyclines & Related Rash         sodium chloride flush 0.9 % injection 10 mL 2 times per day   sodium chloride flush 0.9 % injection 10 mL PRN   magnesium hydroxide (MILK OF MAGNESIA) 400 MG/5ML suspension 30 mL Daily PRN   ondansetron (ZOFRAN) injection 4 mg Q6H PRN   enoxaparin (LOVENOX) injection 30 mg Daily   allopurinol

## 2019-08-24 NOTE — ED PROVIDER NOTES
Emergency [address] EMERGENCY DEPARTMENT    Patient: Dora La  MRN: [id number]  : 1950  Date of Evaluation: 2019  ED Supervising Physician: Nayeli Clark MD    I independently examined and evaluated Dora La. In brief, Dora La is a 70 y.o. male that presents to the emergency department with a week of weight gain, lower extremity edema, dyspnea on exertion and with lying flat. States that he has not had improvement with increasing his Lasix. Also states he has had constipation for about a week but is passing flatus and has no nausea or vomiting. Focused exam: Well-appearing patient in no acute distress. Cardiac exam reveals normal heart rate and rhythm without any murmurs. Lungs sounds diminished bilaterally with no increased work of breathing. Abdomen is soft, nontender, nondistended. Peripheral edema    Brief ED course/MDM: Patient's presentation most consistent with acute on chronic heart failure. He has known systolic heart failure with an EF of about 20%. He has diminished lung sounds bilaterally and significant exertional dyspnea and orthopnea. He will need to be admitted for further management. EKG does show some new T wave inversion in the inferior lateral leads. No chest pain or other symptoms consistent with ACS at this time. Work-up is consistent with acute heart failure. Given Lasix here and admitted for further management. EKG: Normal sinus rhythm with normal axis. Inverted T waves in the inferior lateral leads. Incomplete left bundle branch block. QTc is normal.  New changes compared with 2019    All diagnostic, treatment, and disposition decisions were made by myself in conjunction with the [facility]. For all further details of the patient's emergency department visit, please see their documentation.     (Please note that portions of this note may have been completed with a voice

## 2019-08-24 NOTE — H&P
Prophylaxis [x] Lovenox, []  Heparin, [] SCDs, [] Ambulation   GI Prophylaxis [] PPI,  [] H2 Blocker,  [] Carafate,  [] Diet/Tube Feeds   Code Status Prior   Disposition Patient requires continued admission due to CHF exercerbation   CMS Level of Risk [] Low, [] Moderate,[x]  High  Patient's risk as above due to CHF exacerbation.        Dr. Muriel Ruiz MD 8/24/2019 3:02 AM

## 2019-08-24 NOTE — PROGRESS NOTES
Skin assessment completed, no open areas or redness noted, pt prefers to keep home pants on, hospital gown in place, does have home continuous glucose monitor located in [facility] with current reading of 98. Pt states he can drop during night, apple juice and snack provided at bedside.

## 2019-08-24 NOTE — ED PROVIDER NOTES
eMERGENCY dEPARTMENT eNCOUnter      PCP: Roberta Owen MD    CHIEF COMPLAINT    Chief Complaint   Patient presents with    Shortness of Breath    Leg Swelling     Pt was seen by physician    HPI    Taylor Arciniega is a 70 y.o. male who presents with shortness of breath and constipation. Onset was 1 week. Patient reports that he has been constipated for the last week. Reports that he was using MiraLAX for last 3 days and last bowel movement around 3 days ago. Started using Ex-Lax today. He has been feeling short of breath for around the last week but was told that the shortness of breath was likely related to some of the constipation. Reports that today he had worsening shortness of breath and dyspnea on exertion. Also reports that he could not get comfortable lying down to sleep as he could not breathe tonight. Per review of his chart he did have an echocardiogram done in April of this year which showed ejection fraction of 20 to 25%. Patient had his Lasix increased to 40 mg twice daily 4 days ago. Was taking 40 mg in the morning and 20 mg at night prior to this. He does report around a week of some increased lower extremity swelling. He reports a 10 pound weight gain in the last week. He denies fevers. Denies hemoptysis. Denies chest pain. REVIEW OF SYSTEMS    Constitutional:  Denies fever, chills, weight loss or weakness   HENT:  Denies sore throat or ear pain   Cardiovascular:  No chest pain. No palpitations, No syncope  Respiratory:  See HPI.    GI:  Denies abdominal pain, nausea, vomiting, or diarrhea  :  Denies any urinary symptoms   Musculoskeletal:  Denies back pain,   Skin:  Denies rash  Neurologic:  Denies headache, focal weakness or sensory changes   Endocrine:  Denies polyuria or polydypsia   Lymphatic:  Denies swollen glands     All other review of systems are negative  See HPI and nursing notes for additional information       [address]    Past Medical Absolute 0.9 K/CU MM    Basophils Absolute 0.0 K/CU MM    Nucleated RBC % 0.0 %    Total Nucleated RBC 0.0 K/CU MM    Total Immature Neutrophil 0.02 K/CU MM    Immature Neutrophil % 0.2 0 - 0.43 %   CMP   Result Value Ref Range    Sodium 129 (L) 135 - 145 MMOL/L    Potassium 4.3 3.5 - 5.1 MMOL/L    Chloride 90 (L) 99 - 110 mMol/L    CO2 26 21 - 32 MMOL/L     (H) 6 - 23 MG/DL    CREATININE 3.4 (H) 0.9 - 1.3 MG/DL    Glucose 97 70 - 99 MG/DL    Calcium 8.9 8.3 - 10.6 MG/DL    Alb 3.8 3.4 - 5.0 GM/DL    Total Protein 5.9 (L) 6.4 - 8.2 GM/DL    Total Bilirubin 0.4 0.0 - 1.0 MG/DL    ALT 44 (H) 10 - 40 U/L    AST 36 15 - 37 IU/L    Alkaline Phosphatase 168 (H) 40 - 128 IU/L    GFR Non- 18 (L) >60 mL/min/1.73m2    GFR  22 (L) >60 mL/min/1.73m2    Anion Gap 13 4 - 16   Troponin   Result Value Ref Range    Troponin T 0.074 (H) <0.01 NG/ML   Brain Natriuretic Peptide   Result Value Ref Range    Pro-BNP 12,552 (H) <300 PG/ML         RADIOLOGY/PROCEDURES    CXR:    Xr Abdomen (kub) (single Ap View)    Result Date: 8/24/2019  EXAMINATION: ONE SUPINE XRAY VIEW(S) OF THE ABDOMEN 8/24/2019 12:51 am COMPARISON: Same day chest radiograph HISTORY: ORDERING SYSTEM PROVIDED HISTORY: constipation TECHNOLOGIST PROVIDED HISTORY: Reason for exam:->constipation Reason for Exam: chest pain Acuity: Acute Type of Exam: Initial FINDINGS: Large colonic stool volume, sparing the rectum. Nonobstructive bowel gas pattern. No free air or portal venous gas. No overt organomegaly. No calcifications suspicious for urinary tract stone disease. No acute osseous abnormality. Vascular calcifications evident. Bilateral pleural effusions noted. Large colonic stool volume without obstruction, consistent with history of constipation.      Xr Chest Portable    Result Date: 8/24/2019  EXAMINATION: ONE XRAY VIEW OF THE CHEST 8/24/2019 12:51 am COMPARISON: Chest radiograph 04/30/2019 HISTORY: ORDERING SYSTEM PROVIDED kidney disease and elevation in troponin. He denies any chest pain and had no acute EKG changes. He will need admitted for diuresis and fluid management. Patient in agreement with plan for admission. I did speak to the hospitalist service who graciously agreed to admit. Comment: Please note this report has been produced using speech recognition software and may contain errors related to that system including errors in grammar, punctuation, and spelling, as well as words and phrases that may be inappropriate. If there are any questions or concerns please feel free to contact the dictating provider for clarification.                         Rogelio Beal PA-C  08/24/19 6752

## 2019-08-25 NOTE — PROGRESS NOTES
Taylor Arciniega is a 70 y.o. male patient breathing is better and urinated alot    Current Facility-Administered Medications   Medication Dose Route Frequency Provider Last Rate Last Dose    sodium chloride flush 0.9 % injection 10 mL  10 mL Intravenous 2 times per day Corrie Barraza MD   Stopped at 08/25/19 0818    sodium chloride flush 0.9 % injection 10 mL  10 mL Intravenous PRN Corrie Barraza MD        magnesium hydroxide (MILK OF MAGNESIA) 400 MG/5ML suspension 30 mL  30 mL Oral Daily PRN Corrie Barraza MD        ondansetron (ZOFRAN) injection 4 mg  4 mg Intravenous Q6H PRN Jackie Turcios MD        enoxaparin (LOVENOX) injection 30 mg  30 mg Subcutaneous Daily Jackie Turcios MD   30 mg at 08/24/19 0936    allopurinol (ZYLOPRIM) tablet 100 mg  100 mg Oral BID Corrie Barraza MD   100 mg at 08/25/19 0810    aspirin chewable tablet 81 mg  81 mg Oral Daily Jackie Turcios MD   81 mg at 08/25/19 0811    carvedilol (COREG) tablet 6.25 mg  6.25 mg Oral BID WC Jackie Turcios MD   6.25 mg at 08/25/19 0810    isosorbide mononitrate (IMDUR) extended release tablet 30 mg  30 mg Oral Daily Jackie Turcios MD   30 mg at 08/25/19 0811    glucose (GLUTOSE) 40 % oral gel 15 g  15 g Oral PRN Corrie Barraza MD        dextrose 50 % IV solution  12.5 g Intravenous PRN Corrie Barraza MD        glucagon (rDNA) injection 1 mg  1 mg Intramuscular PRN Corrie Barraza MD        dextrose 5 % solution  100 mL/hr Intravenous PRN Corrie Barraza MD        magnesium oxide (MAG-OX) tablet 400 mg  400 mg Oral Daily Jackie Turcios MD   400 mg at 08/25/19 0811    furosemide (LASIX) 100 mg in dextrose 5 % 100 mL infusion  10 mg/hr Intravenous Continuous Sharad Younger DO 10 mL/hr at 08/25/19 0140 10 mg/hr at 08/25/19 0140    Insulin Degludec SOPN 20 Units (PATIENT SUPPLIED MED)  20 Units Subcutaneous Daily Rosemarie William MD   20 Units at 08/25/19 0813    docusate sodium (COLACE) capsule 100 mg  100 mg candidate for ACE/ARB/ARNI/Aldactone due to CKD  -CXR with pleural effusion and CHF--May need tap in future  -TSH wnl  -neg 1.1L  Acute kidney injury with CKD 4  -IV lasix drip stopped as worsening renal function  Hypervolemic Hyponatremia  -improved with diuresis  DM  -SSI  Constipation  -add bowel regimen  Elevated LFTs  -decresed from previous and suspect hepatic congestion  Trop elevation  -do not suspect ACS and from CHF and CKD  Thrombocytopenia   -monitor t  DVT prophylaxis  -lovenox      Yasmine Euceda MD  8/25/2019

## 2019-08-25 NOTE — PROGRESS NOTES
08/25/19  0459   AST 36 20   ALT 44* 33   BILITOT 0.4 0.5   ALKPHOS 168* 152*     Troponin: No results for input(s): TROPONINI in the last 72 hours. BNP: No results for input(s): BNP in the last 72 hours. Lipids: No results for input(s): CHOL, HDL in the last 72 hours. Invalid input(s): LDLCALCU  ABGs: No results found for: PHART, PO2ART, ERF3MJE  INR: No results for input(s): INR in the last 72 hours. Objective:   Vitals: BP (!) 122/49   Pulse 77   Temp 97.6 °F (36.4 °C) (Oral)   Resp 20   Ht 5' 10\" (1.778 m)   Wt 215 lb (97.5 kg)   SpO2 96%   BMI 30.85 kg/m²   General appearance: alert and cooperative with exam, in no acute distress  HEENT: normocephalic, atraumatic,   Neck: supple, trachea midline  Lungs:breathing comfortably on room air  Heart[de-identified] regular rate and rhythm,   Abdomen: soft, non-tender; non distended,   Extremities: extremities atraumatic, no cyanosis; 1+ pitting edema  Neurologic: alert, oriented, follows commands, interactive    Assessment and Plan:     - CKD4 from polycystic kidney dz, cr baseline 3.5  - Fluid overload  - hyponatremia: from volume overload?   - Acute on chronic chf  - HTN  - Constipation on KUB     Plan:  - stop lasix gtt today and allow some reequilibration  - serum sodium is better  - Limit fluid intake  - avoid nephrotoxins  - no fleet enema please                  Electronically signed by Asiya Friedman DO on 8/25/2019 at 10:06 AM    [facility]  MD Santos Cervantes DO [address],  [address]  PHONE: [phone]  FAX: [phone]

## 2019-08-25 NOTE — PROGRESS NOTES
Oropharynx moist, No oral exudates, Nose normal. Neck- Normal range of motion, No tenderness, Supple, No stridor. Eyes:  PERRL, EOMI, Conjunctiva normal, No discharge. Respiratory:  Normal breath sounds, No respiratory distress, No wheezing, No chest tenderness. ,no use of accessory muscles, diaphragm movement is normal  Cardiovascular: (PMI) apex non displaced,no lifts no thrills, no s3,no s4, Normal heart rate, Normal rhythm, No murmurs, No rubs, No gallops. Carotid arteries pulse and amplitude are normal no bruit, no abdominal bruit noted ( normal abdominal aorta ausculation), femoral arteries pulse and amplitude are normal no bruit, pedal pulses are normal  GI:  Bowel sounds normal, Soft, No tenderness, No masses, No pulsatile masses. : External genitalia appear normal, No masses or lesions. No discharge. No CVA tenderness. Musculoskeletal:  Intact distal pulses, No edema, No tenderness, No cyanosis, No clubbing. Good range of motion in all major joints. No tenderness to palpation or major deformities noted. Back- No tenderness. Integument:  Warm, Dry, No erythema, No rash. Lymphatic:  No lymphadenopathy noted. Neurologic:  Alert & oriented x 3, Normal motor function, Normal sensory function, No focal deficits noted.    Psychiatric:  Affect normal, Judgment normal, Mood normal.     Medications:    sodium chloride flush  10 mL Intravenous 2 times per day    enoxaparin  30 mg Subcutaneous Daily    allopurinol  100 mg Oral BID    aspirin  81 mg Oral Daily    carvedilol  6.25 mg Oral BID WC    isosorbide mononitrate  30 mg Oral Daily    magnesium oxide  400 mg Oral Daily    Insulin Degludec  20 Units Subcutaneous Daily    docusate sodium  100 mg Oral Daily    polyethylene glycol  17 g Oral Daily    insulin aspart  5 Units Subcutaneous TID AC    insulin aspart  1 Units Subcutaneous TID AC    pravastatin  10 mg Oral Nightly    calcitRIOL  0.25 mcg Oral Nightly      dextrose       sodium

## 2019-08-25 NOTE — PROGRESS NOTES
Checked blood glucose levels with our meter and pt compared with his dexcom v6 meter which had been reading 97. Pt re calibrated his meter as we had blood glucose levels of 162 and 150.  After recalibration his meter showed 151

## 2019-08-26 NOTE — CARE COORDINATION
.CM met with pt and spouse for d/c planning. Introduced self and updated white board. Pt lives with spouse and is independent with ADL's. Pt drives, has a PCP, has insurance, and is able to afford  medication. Pt denies having any DME or services. [facility] offered and pt refused. Pt denies any needs at this time. D/c plan is home with spouse, no needs. CM will continue to follow.   TE

## 2019-08-26 NOTE — PROGRESS NOTES
Today's plan: continue present care, blood glucose was 58, will recommend to adjust diabetes medications, off lasix, creatine is 3.7    Admit Date:  8/24/2019    Subjective: better      Chief complaints on admission  Chief Complaint   Patient presents with    Shortness of Breath    Leg Swelling         History of present illness:Manjit is a 70 y. o.year old who  presents with had concerns including Shortness of Breath and Leg Swelling. Past medical history:    has a past medical history of Abnormal EKG, CAD (coronary artery disease), Cardiomyopathy ([address] Diabetes mellitus ([facility] [address] Family history of coronary artery disease, Gout, H/O cardiovascular stress test, H/O echocardiogram, History of cardiovascular stress test, History of echocardiogram, Hx of left bundle branch block, Hyperlipidemia, Hypertension, and ICD (implantable cardioverter-defibrillator) battery depletion. Past surgical history:   has a past surgical history that includes Cataract removal and Percutaneous Transluminal Coronary Angio. Social History:   reports that he has quit smoking. His smoking use included cigarettes. He quit after 11.00 years of use. He has quit using smokeless tobacco. He reports that he does not drink alcohol or use drugs. Family history:  family history includes Cancer in his mother; Diabetes in his father; Heart Attack in his father; Heart Disease in his father; Kidney Disease in his father.     Allergies   Allergen Reactions    Penicillins Rash    Tetracyclines & Related Rash         Objective:   BP (!) 110/94   Pulse 74   Temp 97.9 °F (36.6 °C) (Oral)   Resp 18   Ht 5' 10\" (1.778 m)   Wt 215 lb (97.5 kg)   SpO2 98%   BMI 30.85 kg/m²       Intake/Output Summary (Last 24 hours) at 8/26/2019 0630  Last data filed at 8/26/2019 0559  Gross per 24 hour   Intake 1745 ml   Output 2750 ml   Net -1005 ml         Physical Exam:  Constitutional:  Well developed, Well nourished, No acute distress, Non-toxic

## 2019-08-27 NOTE — PROGRESS NOTES
Today's plan:EP consult appreciated, patient is scheduled for thoracentesis, has hyponatremia and advanced renal disease, still has orthopnea, renal ultrasound to follow, he is back on lasix    Admit Date:  8/24/2019    Subjective: better      Chief complaints on admission  Chief Complaint   Patient presents with    Shortness of Breath    Leg Swelling         History of present illness:Manjit is a 70 y. o.year old who  presents with had concerns including Shortness of Breath and Leg Swelling. Past medical history:    has a past medical history of Abnormal EKG, CAD (coronary artery disease), Cardiomyopathy ([address] Diabetes mellitus ([facility] [address] Family history of coronary artery disease, Gout, H/O cardiovascular stress test, H/O echocardiogram, History of cardiovascular stress test, History of echocardiogram, Hx of left bundle branch block, Hyperlipidemia, Hypertension, and ICD (implantable cardioverter-defibrillator) battery depletion. Past surgical history:   has a past surgical history that includes Cataract removal and Percutaneous Transluminal Coronary Angio. Social History:   reports that he has quit smoking. His smoking use included cigarettes. He quit after 11.00 years of use. He has quit using smokeless tobacco. He reports that he does not drink alcohol or use drugs. Family history:  family history includes Cancer in his mother; Diabetes in his father; Heart Attack in his father; Heart Disease in his father; Kidney Disease in his father.     Allergies   Allergen Reactions    Penicillins Rash    Tetracyclines & Related Rash         Objective:   BP (!) 124/45   Pulse 77   Temp 97.8 °F (36.6 °C) (Oral)   Resp 13   Ht 5' 10\" (1.778 m)   Wt 215 lb (97.5 kg)   SpO2 94%   BMI 30.85 kg/m²       Intake/Output Summary (Last 24 hours) at 8/27/2019 0704  Last data filed at 8/26/2019 1900  Gross per 24 hour   Intake 1320 ml   Output 1800 ml   Net -480 ml         Physical Exam:  Constitutional:  Well

## 2019-08-27 NOTE — PROGRESS NOTES
Intravenous PRN Cristiano Guzman MD        magnesium oxide (MAG-OX) tablet 400 mg  400 mg Oral Daily Jackie Turcios MD   400 mg at 08/27/19 0930    docusate sodium (COLACE) capsule 100 mg  100 mg Oral Daily Eloy Martinez MD   100 mg at 08/27/19 0930    polyethylene glycol (GLYCOLAX) packet 17 g  17 g Oral Daily Eloy Martinez MD   17 g at 08/27/19 0930    bisacodyl (DULCOLAX) EC tablet 5 mg  5 mg Oral Daily PRN Eloy Martinez MD        insulin aspart (NOVOLOG) injection pen 5 Units (PATIENT SUPPLIED MED)  5 Units Subcutaneous TID AC Eloy Martinez MD   5 Units at 08/27/19 1236    insulin aspart (NOVOLOG) injection pen 1 Units (PATIENT SUPPLIED MED)  1 Units Subcutaneous TID AC Eloy Martinez MD   1 Units at 08/27/19 0931    pravastatin (PRAVACHOL) tablet 10 mg  10 mg Oral Nightly Eloy Martinez MD   10 mg at 08/26/19 2214    calcitRIOL (ROCALTROL) capsule 0.25 mcg  0.25 mcg Oral Nightly Eloy Martinez MD   0.25 mcg at 08/26/19 2205     Allergies   Allergen Reactions    Penicillins Rash    Tetracyclines & Related Rash     Active Problems:    Heart failure exacerbated by sotalol [facility])  Resolved Problems:    * No resolved hospital problems. *    Blood pressure 137/61, pulse 79, temperature 97.8 °F (36.6 °C), temperature source Oral, resp. rate 16, height 5' 10\" (1.778 m), weight 215 lb (97.5 kg), SpO2 96 %. Subjective:  Pain:  He reports no pain. Objective:  General Appearance:  Comfortable. Vital signs: (most recent): Blood pressure (!) 124/99, pulse 72, temperature 97.4 °F (36.3 °C), temperature source Oral, resp. rate 16, height 5' 10\" (1.778 m), weight 215 lb (97.5 kg), SpO2 96 %. Lungs:  Normal effort. (Decreased bases)  Extremities: There is dependent edema. Neurological: Patient is alert.       Assessment & Plan'=    Acute on chronic Systolic CHF and diastolic with mod pulm HTN  -lasix drip stopped due to worsening LEEANN  -BB, imdur, not candidate for ACE/ARB/ARNI/Aldactone due to CKD  -CXR with

## 2019-08-27 NOTE — CONSULTS
ABDOMEN 8/24/2019 12:51 am     Large colonic stool volume without obstruction, consistent with history of constipation. Xr Chest Portable    Result Date: 8/24/2019  EXAMINATION: ONE XRAY VIEW OF THE CHEST 8/24/2019 12:51 am COMPARISON: Chest radiograph 04/30/2019 HISTORY: ORDERING SYSTEM PROVIDED HISTORY: cp TECHNOLOGIST PROVIDED HISTORY: Reason for exam:->cp Reason for Exam: chest pain Acuity: Acute Type of Exam: Initial FINDINGS: Bilateral pleural effusions and basilar opacities. Cardiac silhouette is mostly obscured. Central vascular congestion. No pneumothorax. Intact dual lead AICD with left chest generator. No acute osseous abnormality. Healed rib fractures. Moderate volume bilateral pleural effusions with atelectasis. Difficult to exclude superimposed infectious airspace disease. Scheduled Medicines   Medications:    Insulin Degludec  10 Units Subcutaneous Daily    bumetanide  2 mg Intravenous 3 times per day    sodium chloride flush  10 mL Intravenous 2 times per day    enoxaparin  30 mg Subcutaneous Daily    allopurinol  100 mg Oral BID    aspirin  81 mg Oral Daily    carvedilol  6.25 mg Oral BID WC    isosorbide mononitrate  30 mg Oral Daily    magnesium oxide  400 mg Oral Daily    docusate sodium  100 mg Oral Daily    polyethylene glycol  17 g Oral Daily    insulin aspart  5 Units Subcutaneous TID AC    insulin aspart  1 Units Subcutaneous TID AC    pravastatin  10 mg Oral Nightly    calcitRIOL  0.25 mcg Oral Nightly      Infusions:    dextrose           IMPRESSION    Patient Active Problem List   Diagnosis    Abnormal EKG    Hx of left bundle branch block    Family history of coronary artery disease    ICD (implantable cardioverter-defibrillator) battery depletion    CAD (coronary artery disease)    Hyperlipidemia    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 Reviewed POC blood glucose .  Labs and X ray

## 2019-08-27 NOTE — PROGRESS NOTES
08/25/19  0459 08/26/19  0600 08/27/19  0146   * 132* 129*   K 4.0 4.0 3.5   CL 90* 88* 89*   CO2 30 30 27   * 126* 122*   CREATININE 3.7* 3.6* 3.4*   GLUCOSE 100* 95 192*     Hepatic:   Recent Labs     08/25/19  0459   AST 20   ALT 33   BILITOT 0.5   ALKPHOS 152*     Troponin: No results for input(s): TROPONINI in the last 72 hours. BNP: No results for input(s): BNP in the last 72 hours. Lipids: No results for input(s): CHOL, HDL in the last 72 hours.     Invalid input(s): LDLCALCU  ABGs: No results found for: PHART, PO2ART, MJS1ASQ  INR:   Recent Labs     08/26/19  1553   INR 1.09       Objective:   Vitals: /61   Pulse 79   Temp 97.8 °F (36.6 °C) (Oral)   Resp 16   Ht 5' 10\" (1.778 m)   Wt 215 lb (97.5 kg)   SpO2 96%   BMI 30.85 kg/m²   General appearance: alert and cooperative with exam, in no acute distress  HEENT: normocephalic, atraumatic,   Neck: supple, trachea midline  Lungs:  breathing comfortably   Heart[de-identified] regular rate and rhythm, S1, S2 normal,  Abdomen: soft, non-tender; non distended,   Extremities:1+ pitting ble edema  Neurologic: alert, oriented, follows commands, interactive    Assessment and Plan:     - 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, cr baseline 3.5//pt reported that had polycystic kidney dz but the renal US from 8/26/19 shows otherwise  - Fluid overload  - hyponatremia: from volume overload  - Acute on chronic chf  - HTN  - Constipation on KUB     Plan:  - Cr stable, BUN elevated  - bumex drip this am and give a bolus dose of bumex 2mg iv once at noon  - we discussed the possibility for UF if we do not get appropriate diuresis on the bumex gtt  - Limit fluid intake  - avoid nephrotoxins  - no fleet enema please                        Electronically signed by Capo Garcia DO on 8/27/2019 at 10:01 AM    [facility]  Sergio Valle MD  [address]  [address], [place] [address]  PHONE: [phone]  FAX:

## 2019-08-27 NOTE — CONSULTS
Electrophysiology Consult Note      Reason for consultation: generator change    Chief complaint :  Shortness of breath and edema    Referring physician: Federica Cunha      Primary care physician: Jason Sorensen MD      History of Present Illness:      Uri Pink is a 70 y.o. male with a past medical history of CAD, HLD, and left ventricular systolic dysfunction EF 07-64% s/p ICD who presents with complaints  of shortness of breath and bilateral lower leg edema. He states that he begin to notice roughly a 20 pound weight gain over the course of the last week. He states that he had has progressively worsening shortness of breath with exertion that resolved with rest. However he then began to have orthopnea. He states that he additionally has had progressively worsening bilateral lower leg edema. He states that he usually takes 20 mg of oral Lasix BID however recently it was increased to  40 mg BID. He states that the edema in his legs did not improve with the increased dose of lasix. He is to have a generator change to his ICD on Friday with Dr Josefa Connor. Electrophysiology was consulted for Generator change to current ICD. He denies chest pain, palpitations, dizziness, and syncope.       Pastmedical history:   Past Medical History:   Diagnosis Date    Abnormal EKG     CAD (coronary artery disease)     Cardiomyopathy (HCC)     EF 27%    [facility]congestive heart failure) ([facility])     Grade II diastolic dysfunction suspected    Diabetes mellitus ([address]     Family history of coronary artery disease     Gout     H/O cardiovascular stress test 05/16/2019    EF 22% abn study    H/O echocardiogram 04/30/2019    EF 20-25% mod phtn, mild aortic insufficiency, mild MR and TR    History of cardiovascular stress test 01/14/2018    EF27% - [facility]    History of echocardiogram 01/12/2018    EF 25% Trace MR -[facility]    Hx of left bundle branch block     Hyperlipidemia     Hypertension     ICD (implantable cardioverter-defibrillator) battery depletion 8/8/2019    Biotronik [facility] in here 2000 [facility]       Surgical history :   Past Surgical History:   Procedure Laterality Date    CATARACT REMOVAL      PTCA      4 stents       Family history:   Family History   Problem Relation Age of Onset    Cancer Mother     Diabetes Father     Heart Disease Father     Kidney Disease Father     Heart Attack Father        Social history :  reports that he has quit smoking. His smoking use included cigarettes. He quit after 11.00 years of use. He has quit using smokeless tobacco. He reports that he does not drink alcohol or use drugs. Allergies   Allergen Reactions    Penicillins Rash    Tetracyclines & Related Rash       No current facility-administered medications on file prior to encounter. Current Outpatient Medications on File Prior to Encounter   Medication Sig Dispense Refill    insulin aspart (NOVOLOG FLEXPEN) 100 UNIT/ML injection pen Inject into the skin 3 times daily (before meals) Sliding scale coverage. 120-150 I unit  150-and over 2 units      mupirocin (BACTROBAN NASAL) 2 % nasal ointment by Nasal route 2 times daily Take by Nasal route 2 times daily. for 5 days prior to the procedure. 1 Tube 3    chlorhexidine gluconate (ANTISEPTIC SKIN CLEANSER) 4 % SOLN external solution Apply topically daily as needed (PRIOR TO PROCEDURE) Use this solution to scrub the area and take a shower 48 hours prior to the procedure and in the morning of procedure.  1 Bottle 0    potassium chloride (KLOR-CON) 20 MEQ packet Take 20 mEq by mouth daily      isosorbide mononitrate (IMDUR) 30 MG extended release tablet Take 1 tablet by mouth daily 90 tablet 3    furosemide (LASIX) 40 MG tablet Take 1 tablet by mouth 2 times daily (Patient taking differently: Take 40 mg by mouth 2 times daily Indications: pt takes 40mg in AM and 20mg in PM per renal ) 60 tablet 3    calcitRIOL (ROCALTROL) 0.25 MCG capsule Take 0.25 mcg by mouth daily      pravastatin (PRAVACHOL) 10 MG tablet Take 10 mg by mouth daily      Cholecalciferol (VITAMIN D3 PO) Take 125 mcg by mouth      Insulin Degludec (TRESIBA FLEXTOUCH) 100 UNIT/ML SOPN Inject 20 Units into the skin daily       insulin aspart (NOVOLOG) 100 UNIT/ML injection vial Inject 5 Units into the skin 3 times daily (before meals) Indications: if under 80 take 4 units       allopurinol (ZYLOPRIM) 100 MG tablet Take 100 mg by mouth 2 times daily       magnesium chloride (MAG DELAY 64) 535 (64 MG) MG TBCR CR tablet Take 64 mg by mouth daily.  vitamin E 400 UNIT capsule Take 400 Units by mouth daily.  aspirin 81 MG tablet Take 81 mg by mouth daily. Review of Systems:   Review of Systems   Constitutional: Positive for fatigue. Negative for activity change, chills and fever. HENT: Negative for congestion, ear pain and tinnitus. Eyes: Negative for photophobia, pain and visual disturbance. Respiratory: Positive for shortness of breath. Negative for cough, chest tightness and wheezing. Cardiovascular: Positive for leg swelling. Negative for chest pain and palpitations. Gastrointestinal: Positive for abdominal distention. Negative for abdominal pain, blood in stool, constipation, diarrhea, nausea and vomiting. Endocrine: Negative for cold intolerance and heat intolerance. Genitourinary: Negative for dysuria, flank pain and hematuria. Musculoskeletal: Positive for arthralgias. Negative for back pain, myalgias and neck stiffness. Skin: Negative for color change and rash. Allergic/Immunologic: Negative for food allergies. Neurological: Negative for dizziness, light-headedness, numbness and headaches. Hematological: Does not bruise/bleed easily. Psychiatric/Behavioral: Negative for agitation, behavioral problems and confusion.            Examination:      BP: 120/76  HR:

## 2019-08-28 NOTE — PROGRESS NOTES
edema is noted. Cardiomediastinal silhouette and bony thorax are unchanged. Stable cardiomegaly. Cardiomegaly with bilateral pleural effusions and mild interstitial pulmonary edema suggesting CHF. Pulmonary edema has slightly increased since prior exam. No pneumothorax status post thoracentesis. Xr Chest Portable    Result Date: 8/24/2019  EXAMINATION: ONE XRAY VIEW OF THE CHEST 8/24/2019 12:51 am COMPARISON: Chest radiograph 04/30/2019 HISTORY: ORDERING SYSTEM PROVIDED HISTORY: cp TECHNOLOGIST PROVIDED HISTORY: Reason for exam:->cp Reason for Exam: chest pain Acuity: Acute Type of Exam: Initial FINDINGS: Bilateral pleural effusions and basilar opacities. Cardiac silhouette is mostly obscured. Central vascular congestion. No pneumothorax. Intact dual lead AICD with left chest generator. No acute osseous abnormality. Healed rib fractures. Moderate volume bilateral pleural effusions with atelectasis. Difficult to exclude superimposed infectious airspace disease. Us Retroperitoneal Limited    Result Date: 8/27/2019  EXAMINATION: ULTRASOUND OF THE KIDNEYS 8/27/2019 6:10 am COMPARISON: None HISTORY: ORDERING SYSTEM PROVIDED HISTORY: LEEANN TECHNOLOGIST PROVIDED HISTORY: Reason for Exam: LEEANN Acuity: Acute Type of Exam: Initial FINDINGS: Suboptimal visualization bilaterally but especially on the right due to rib shadowing and bowel gas. Borderline atrophy. Suggestion of parenchymal heterogeneity with some hyperechoic areas, but no visualization of the liver for comparison. No hydronephrosis, shadowing calculi, nor perinephric fluid. Simple cysts on the left, the larger measuring 3.0 cm x 2.7 cm x 2.9 cm. Renal lengths in longitudinal axis:  9.0 cm right, 8.8 cm left     1. Suboptimal visualization of the kidneys with suspected parenchymal heterogeneity. Some areas appear hyperechoic, but there is no comparison to the liver. Underlying parenchymal disease could be present.  2. Left renal cysts for

## 2019-08-28 NOTE — PROGRESS NOTES
Daily    magnesium oxide  400 mg Oral Daily    docusate sodium  100 mg Oral Daily    polyethylene glycol  17 g Oral Daily    pravastatin  10 mg Oral Nightly    calcitRIOL  0.25 mcg Oral Nightly      bumetanide 0.1 mg/mL infusion 2 mg/hr (08/28/19 0831)    dextrose       sodium chloride flush, magnesium hydroxide, ondansetron, glucose, dextrose, glucagon (rDNA), dextrose, bisacodyl    Lab Data:  CBC:   No results for input(s): WBC, HGB, HCT, MCV, PLT in the last 72 hours. BMP:   Recent Labs     08/26/19  0600 08/27/19  0146 08/28/19  0626   * 129* 135   K 4.0 3.5 3.7   CL 88* 89* 90*   CO2 30 27 30   * 122* 126*   CREATININE 3.6* 3.4* 3.7*     LIVER PROFILE:   No results for input(s): AST, ALT, LIPASE, BILIDIR, BILITOT, ALKPHOS in the last 72 hours. Invalid input(s): AMYLASE,  ALB  PT/INR:   Recent Labs     08/26/19  1553   PROTIME 12.4   INR 1.09     APTT:   Recent Labs     08/26/19  1553   APTT 34.5*     BNP:  No results for input(s): BNP in the last 72 hours. TROPONIN: @TROPONINI:3@      Assessment:  70 y. o.year old who is admitted for          Plan:  1. Chf: fluid overload with systolic CHF AND advanced renal disease, diuresus as per nehrology  2. CAD and ICM:h/o RCA AND LAD STENT in past, continue aspirin, statins BB  3. ICD LEOLA: Recommend to change generator of ICD  4. CKD: as per nephrology  5. DM STABLE  All labs, medications and tests reviewed, continue all other medications of all above medical condition listed as is.       Saumya Chris MD 8/28/2019 10:16 AM

## 2019-08-29 NOTE — PROGRESS NOTES
Intravenous PRN Chasity Ward MD        glucagon (rDNA) injection 1 mg  1 mg Intramuscular PRN Chasity Ward MD        dextrose 5 % solution  100 mL/hr Intravenous PRN Chasity Ward MD        magnesium oxide (MAG-OX) tablet 400 mg  400 mg Oral Daily Jackie Turcios MD   400 mg at 08/28/19 7311    docusate sodium (COLACE) capsule 100 mg  100 mg Oral Daily Jerzy Lobato MD   100 mg at 08/27/19 0930    polyethylene glycol (GLYCOLAX) packet 17 g  17 g Oral Daily Jerzy Lobato MD   17 g at 08/27/19 0930    bisacodyl (DULCOLAX) EC tablet 5 mg  5 mg Oral Daily PRN Jerzy Lobato MD        pravastatin (PRAVACHOL) tablet 10 mg  10 mg Oral Nightly Jerzy Lobato MD   10 mg at 08/28/19 1957    calcitRIOL (ROCALTROL) capsule 0.25 mcg  0.25 mcg Oral Nightly Jerzy Lobato MD   0.25 mcg at 08/28/19 1957     Allergies   Allergen Reactions    Penicillins Rash    Tetracyclines & Related Rash     Active Problems:    Heart failure exacerbated by sotalol ([address]    Dyspnea  Resolved Problems:    * No resolved hospital problems. *    Blood pressure (!) 117/46, pulse 75, temperature 98.2 °F (36.8 °C), temperature source Oral, resp. rate 17, height 5' 10\" (1.778 m), weight 199 lb 11.2 oz (90.6 kg), SpO2 98 %. Subjective:  Pain:  He reports no pain. Objective:  General Appearance:  Comfortable. Vital signs: (most recent): Blood pressure (!) 117/46, pulse 75, temperature 98.2 °F (36.8 °C), temperature source Oral, resp. rate 17, height 5' 10\" (1.778 m), weight 199 lb 11.2 oz (90.6 kg), SpO2 98 %. Lungs:  Normal effort. (Decreased bases)  Neurological: Patient is alert.       Assessment & Plan'=    Acute on chronic Systolic and diastolic CHF with mod pulm HTN  -BB, imdur, not candidate for ACE/ARB/ARNI/Aldactone due to CKD  -8/27 - dc cancelled yesterday due to orthopnea    Persistent volume overload on hospital day 4  -8/27 - Bumex drip 2 mg per hour started at 11:50 am today  -8/28 - Bumex drip stopped at 7 pm

## 2019-08-29 NOTE — DISCHARGE SUMMARY
mouth 2 times daily (with meals)             chlorhexidine gluconate (ANTISEPTIC SKIN CLEANSER) 4 % SOLN external solution  Apply topically daily as needed (PRIOR TO PROCEDURE) Use this solution to scrub the area and take a shower 48 hours prior to the procedure and in the morning of procedure. Cholecalciferol (VITAMIN D3 PO)  Take 125 mcg by mouth             furosemide (LASIX) 80 MG tablet  Take 1 tablet by mouth 2 times daily             insulin aspart (NOVOLOG FLEXPEN) 100 UNIT/ML injection pen  Inject into the skin 3 times daily (before meals) Sliding scale coverage. 120-150 I unit  150-and over 2 units             insulin aspart (NOVOLOG) 100 UNIT/ML injection vial  Inject 5 Units into the skin 3 times daily (before meals) Indications: if under 80 take 4 units              Insulin Degludec (TRESIBA FLEXTOUCH) 100 UNIT/ML SOPN  Inject 16 Units into the skin nightly             isosorbide mononitrate (IMDUR) 30 MG extended release tablet  Take 1 tablet by mouth daily             magnesium chloride (MAG DELAY 64) 535 (64 MG) MG TBCR CR tablet  Take 64 mg by mouth every other day              metOLazone (ZAROXOLYN) 5 MG tablet  Take 1 tablet by mouth three times a week Take M-W-F 1/2 hour before Lasix             mupirocin (BACTROBAN NASAL) 2 % nasal ointment  by Nasal route 2 times daily Take by Nasal route 2 times daily. for 5 days prior to the procedure. polyethylene glycol (GLYCOLAX) packet  Take 17 g by mouth daily as needed for Constipation             potassium chloride (KLOR-CON) 20 MEQ packet  Take 20 mEq by mouth daily             pravastatin (PRAVACHOL) 10 MG tablet  Take 10 mg by mouth daily             vitamin E 400 UNIT capsule  Take 400 Units by mouth daily.                  Objective Findings at Discharge:   BP (!) 117/37   Pulse 79   Temp 97.6 °F (36.4 °C) (Oral)   Resp 15   Ht 5' 10\" (1.778 m)   Wt 199 lb 11.2 oz (90.6 kg)   SpO2 96%   BMI 28.65 kg/m² PHYSICAL EXAM   GEN Awake male, sitting upright in bed in no apparent distress. Appears given age. LABS:    CBC:   Lab Results   Component Value Date    WBC 8.0 08/25/2019    HGB 12.2 08/25/2019    HCT 38.5 08/25/2019    MCV 90.6 08/25/2019     08/25/2019     BMP:   Lab Results   Component Value Date     08/29/2019    K 3.7 08/29/2019    CL 88 08/29/2019    CO2 33 08/29/2019     08/29/2019    CREATININE 3.8 08/29/2019    CALCIUM 9.6 08/29/2019     IMAGING:    IR GUIDED THORACENTESIS PLEURAL [[id number]] Collected: 08/27/19 1426      Order Status: Completed Updated: 08/27/19 1429     Narrative:       PROCEDURE:  ULTRASOUNDGUIDED bilat THORACENTESIS    8/27/2019    HISTORY:  ORDERING SYSTEM PROVIDED HISTORY: Pleural effusion  TECHNOLOGIST PROVIDED HISTORY:  Reason for exam:->Pleural effusion    TECHNIQUE:  Informed consent was obtained after a detailed explanation of the procedure  including risks, benefits, and alternatives. Universal protocol was  performed. The right chest was prepped and draped in sterile fashion and  local anesthesia was achieved with lidocaine. An 8 Malaysian needle sheath was  advanced under ultrasound guidance into pleural effusion and thoracentesis  was performed. The patient tolerated the procedure well. Informed consent was obtained after a detailed explanation of the procedure  including risks, benefits, and alternatives. Universal protocol was  performed. The left chest was prepped and draped in sterile fashion and local  anesthesia was achieved with lidocaine. An 8 Malaysian needle sheath was  advanced under ultrasound guidance into pleural effusion and thoracentesis  was performed. The patient tolerated the procedure well. FINDINGS:  A total of 1000 cc from the right in 1100 cc from the left was removed.   Serosanguineous fluid bilaterally     Impression:       Successful ultrasound guided thoracentesis bilaterally as described above     XR CHEST PORTABLE [[id number]] Collected: 08/27/19 0943     Order Status: Completed Updated: 08/27/19 0946     Narrative:       EXAMINATION:  ONE XRAY VIEW OF THE CHEST    8/27/2019 9:40 am    COMPARISON:  08/24/2019    HISTORY:  ORDERING SYSTEM PROVIDED HISTORY: post thora  TECHNOLOGIST PROVIDED HISTORY:  Pt going back to the room  Reason for exam:->post thora  Reason for Exam: post thora  Acuity: Unknown  Type of Exam: Unknown    FINDINGS:  AICD/pacer device is stable. Bilateral pleural effusions with bibasilar  atelectasis. No pneumothorax. Mild pulmonary edema is noted. Cardiomediastinal silhouette and bony thorax are unchanged. Stable  cardiomegaly. Impression:       Cardiomegaly with bilateral pleural effusions and mild interstitial pulmonary  edema suggesting CHF. Pulmonary edema has slightly increased since prior  exam.    No pneumothorax status post thoracentesis. US RETROPERITONEAL LIMITED [[id number]] Collected: 08/27/19 0750     Order Status: Completed Updated: 08/27/19 0757     Narrative:       EXAMINATION:  ULTRASOUND OF THE KIDNEYS    8/27/2019 6:10 am    COMPARISON:  None    HISTORY:  ORDERING SYSTEM PROVIDED HISTORY: LEEANN  TECHNOLOGIST PROVIDED HISTORY:  Reason for Exam: LEEANN  Acuity: Acute  Type of Exam: Initial    FINDINGS:  Suboptimal visualization bilaterally but especially on the right due to rib  shadowing and bowel gas. Borderline atrophy. Suggestion of parenchymal heterogeneity with some  hyperechoic areas, but no visualization of the liver for comparison. No  hydronephrosis, shadowing calculi, nor perinephric fluid. Simple cysts on  the left, the larger measuring 3.0 cm x 2.7 cm x 2.9 cm. Renal lengths in longitudinal axis:  9.0 cm right, 8.8 cm left     Impression:       1. Suboptimal visualization of the kidneys with suspected parenchymal  heterogeneity. Some areas appear hyperechoic, but there is no comparison to  the liver. Underlying parenchymal disease could be present.   2. Left

## 2019-08-30 PROBLEM — I50.42 CHRONIC COMBINED SYSTOLIC AND DIASTOLIC CONGESTIVE HEART FAILURE (HCC): Status: ACTIVE | Noted: 2019-01-01

## 2019-08-30 PROBLEM — I25.5 ISCHEMIC CARDIOMYOPATHY: Status: ACTIVE | Noted: 2019-01-01

## 2019-08-30 NOTE — LETTER
? Don't use softened water for cooking and drinking since it contains added salt. ? Avoid medications which contain sodium such as Isela Outing and Bromo Outing. ? For more information; food composition books are available which tell how much sodium is in food. Online sources such as www.[employer] also list amounts. Meats, Poultry, Fish, Legumes, Eggs and Nuts  High-Sodium Foods:  ? Smoked, cured, salted or canned meat, fish or poultry including garsia, cold cuts, ham, frankfurters, sausage, sardines, caviar and anchovies  ? Frozen breaded meats and dinners, such as burritos and pizza  ? Canned entrees, such as ravioli, spam and chili  ? Salted nuts  ? Beans canned with salt added  Low-Sodium Alternatives:  ? Any fresh or frozen beef, lamb, pork, poultry and fish  ? Eggs and egg substitutes  ? Low-sodium peanut butter  ? Dry peas and beans (not canned)  ? Low-sodium canned fish  ? Drained, water or oil packed canned fish or poultry  Dairy Products  High-Sodium Foods:  ? Buttermilk  ? Regular and processed cheese, cheese spreads and sauces  ? Cottage cheese  Low-Sodium Alternatives:  ? Milk, yogurt, ice cream and ice milk  ? Low-sodium cheeses, cream cheese, ricotta cheese and mozzarella  Breads, Grains and Cereals  High-Sodium Foods:  ? Bread and rolls with salted tops  ? Quick breads, self-rising flour, biscuit, pancake and waffle mixes  ? Pizza, croutons and salted crackers  ? Prepackaged, processed mixes for potatoes, rice, pasta and stuffing  Low-Sodium Alternatives:  ? Breads, bagels and rolls without salted tops  ? Muffins and most ready-to-eat cereals  ? All rice and pasta, but do not to add salt when cooking  ? Corn and flour tortillas and noodles  ? Low-sodium crackers and breadsticks  ? Unsalted popcorn, chips and pretzels  Vegetables and Fruits  High-Sodium Foods:  ? Regular canned vegetables and vegetable juices  ?  Olives, pickles, sauerkraut and other pickled vegetables

## 2019-08-30 NOTE — LETTER
Congestive Heart Failure and Heart Disease  Heart failure affects nearly 6 million Americans. Roughly 670,000 people are diagnosed with heart failure each year. It is the leading cause of hospitalization in people older than age 72. What Is Heart Failure? Heart failure does not mean the heart has stopped working. Rather, it means that the heart works less efficiently than normal. Do to various possible causes, blood moves through the heart and body at a slower rate, and pressure in the heart increases. As a result, the heart cannot pump enough oxygen and nutrients to meet the body's needs. The chambers of the heart may respond by stretching to hold more blood to pump through the body or by becoming stiff and thickened. This helps to keep the blood moving, but the heart muscle walls may eventually weaken and become unable to pump as efficiently. As a result, the kidneys may respond by causing the body to retain fluid (water) and salt. If fluid builds up in the arms, legs, ankles, feet, lungs, or other organs, the body becomes congested, and congestive heart failure is the term used to describe the condition. What Causes Heart Failure? Heart failure is caused by many conditions that damage the heart muscle, including:  ? Coronary artery disease. Coronary artery disease (CAD), a disease of the arteries that supply blood and oxygen to the heart, causes decreased blood flow to the heart muscle. If the arteries become blocked or severely narrowed, the heart becomes starved for oxygen and nutrients. ? Heart attack. A heart attack occurs when a coronary artery becomes suddenly blocked, stopping the flow of blood to the heart muscle. A heart attack damages the heart muscle, resulting in a scarred area that does not function properly. ? Cardiomyopathy. Damage to the heart muscle from causes other than artery or blood flow problems, such as from infections or alcohol or drug abuse. A calculation done during an echocardiogram, called the ejection fraction (EF), is used to measure how well your heart pumps with each beat to help determine if systolic or diastolic dysfunction is present. Your doctor can discuss which condition you have. How Is Heart Failure Diagnosed? Your doctor will ask you many questions about your symptoms and medical history. You will be asked about any conditions you have that may cause heart failure (such as coronary artery disease, angina, diabetes, heart valve disease, and high blood pressure). You will be asked if you smoke, take drugs, drink alcohol (and how much you drink), and about what drugs you take. ? Blood tests. Blood tests are used to evaluate kidney and thyroid function as well as to check cholesterol levels and the presence of anemia. Anemia is a blood condition that occurs when there is not enough hemoglobin (the substance in red blood cells that enables the blood to transport oxygen through the body) in a person's blood. ? B-type Natriuretic Peptide (BNP) blood test. BNP is a substance secreted from the heart in response to changes in blood pressure that occur when heart failure develops or worsens. BNP blood levels increase when heart failure symptoms worsen, and decrease when the heart failure condition is stable. The BNP level in a person with heart failure -- even someone whose condition is stable -- may be higher than in a person with normal heart function. BNP levels do not necessarily correlate with the severity of heart failure. ? Chest X-ray. A chest X-ray shows the size of your heart and whether there is fluid build-up around the heart and lungs. ? Echocardiogram. This test is an ultrasound which shows the heart's movement, structure, and function. ?  The Ejection Fraction (EF) is used to measure how well your heart pumps with each beat to determine if systolic dysfunction or heart failure with ? Decongestants such as Sudafed

## 2019-09-03 NOTE — TELEPHONE ENCOUNTER
Dr. Jacqueline Dickey pt is okay with generator change Friday 9-6-19 at 10 and arrival at 8 AM. Pt states he is using the antiseptic skin  but not the nose spray/ointment due to it states not to use with kidney problems. He had Renal Function panel per Dr. Anson Sandoval today and GFR is 13.

## 2019-09-05 PROBLEM — N18.5 STAGE 5 CHRONIC KIDNEY DISEASE NOT ON CHRONIC DIALYSIS (HCC): Status: ACTIVE | Noted: 2019-01-01

## 2019-09-06 PROBLEM — Z95.810 ICD (IMPLANTABLE CARDIOVERTER-DEFIBRILLATOR) IN PLACE: Status: ACTIVE | Noted: 2019-01-01

## 2019-09-06 PROBLEM — E87.70 FLUID OVERLOAD: Status: ACTIVE | Noted: 2019-01-01

## 2019-09-06 PROBLEM — I10 HTN (HYPERTENSION): Status: ACTIVE | Noted: 2019-01-01

## 2019-09-06 PROBLEM — E11.9 TYPE 2 DIABETES MELLITUS (HCC): Status: ACTIVE | Noted: 2019-01-01

## 2019-09-06 PROBLEM — E16.2 HYPOGLYCEMIA: Status: ACTIVE | Noted: 2019-01-01

## 2019-09-09 PROBLEM — N18.6 ESRD (END STAGE RENAL DISEASE) (HCC): Status: ACTIVE | Noted: 2019-01-01

## 2019-09-09 NOTE — TELEPHONE ENCOUNTER
Called Scarlett Joe to see how he is feeling today. I had a message from the weekend from his wife, that Scarlett Joe had just had his pacemaker battery changed and he was feeling dizzy. Left a message to give us a call back to see how he is doing.

## 2019-09-10 NOTE — CARE COORDINATION
Case management consult for HD outpt. Call and faxed packet to [facility] services. [facility] will call LSW when they have secured a chair time.      Electronically signed by SALVADOR Grene on 9/10/2019 at 11:27 AM

## 2019-09-10 NOTE — PROGRESS NOTES
Nutrition Education    Type and Reason for Visit: [facility]ELSE[facility] Nutrition Re-Screen    Nutrition Assessment:  Admit referal due to diet ed. Patient with ESRD on dialysis, follows renal diet at home and used to fluid restriction. Currently on 1500 ml fluid restriction. No diet ed needs on visit at this time. Will continue to follow up during stay. Also followed at outpatient dialysis center.        Electronically signed by Cabrera Jorge RD, LD on 9/10/19 at 2:25 PM    Contact Number: [phone]

## 2019-09-10 NOTE — PROGRESS NOTES
Pt tolerated 2 hr HD treatment fair. 0 fluids removed. R CVC ran well. No complains. Pt educated on access care and procedure. Both ports heparin locked and capped per policy. Report given to RN.

## 2019-09-11 NOTE — PROGRESS NOTES
- on room air  - eager to go home  - no HD today  - ok to dc him and his next HD will be Friday at [place] [address] in [place]  - instructed him not to take anymore potassium supplements at home

## 2019-09-11 NOTE — PLAN OF CARE
Problem: Activity:  Goal: Fatigue will decrease  Description  Fatigue will decrease  9/11/2019 1118 by Hawa Tellez. Siomara Esqueda RN  Outcome: Completed  Goal: Risk for activity intolerance will decrease  Description  Risk for activity intolerance will decrease  9/11/2019 1118 by Hawa Tellez.  Siomara Esqueda RN  Outcome: Completed
Ability to maintain clinical measurements within normal limits will improve  Description  Ability to maintain clinical measurements within normal limits will improve  Outcome: Ongoing  Goal: Complications related to the disease process, condition or treatment will be avoided or minimized  Description  Complications related to the disease process, condition or treatment will be avoided or minimized  Outcome: Ongoing  Goal: Diagnostic test results will improve  Description  Diagnostic test results will improve  Outcome: Ongoing     Problem: Sensory:  Goal: General experience of comfort will improve  Description  General experience of comfort will improve  Outcome: Ongoing     Problem: Skin Integrity:  Goal: Status of oral mucous membranes will improve  Description  Status of oral mucous membranes will improve  Outcome: Ongoing  Goal: Skin integrity will be maintained  Description  Skin integrity will be maintained  Outcome: Ongoing  Goal: Risk for impaired skin integrity will decrease  Description  Risk for impaired skin integrity will decrease  Outcome: Ongoing     Problem: Metabolic:  Goal: Ability to maintain appropriate glucose levels will improve  Description  Ability to maintain appropriate glucose levels will improve  Outcome: Ongoing     Problem: Tissue Perfusion:  Goal: Adequacy of tissue perfusion will improve  Description  Adequacy of tissue perfusion will improve  Outcome: Ongoing

## 2019-09-17 NOTE — PROGRESS NOTES
Patient seen for site check post ICD implant. Dressing removed. 10 Staples removed. No signs of inflammation or infection noted. Edges well approximated. Patient has no complaints of pain or discomfort. Single steri strip applied. Patient instructed to no lift arm higher than shoulder level. Instruction given on the use of [employer] monitor. Patient also had complaints of blood pressure being low after taking his metoprolol 50 mg daily. He states that his blood pressure drops to in the 80's and he has dizziness. I spoke with Dr. Chrissie Doherty and he stated to change his metoprolol to 25 mg daily and to keep record of blood pressure. Prescription changed in chart and new Rx sent to [facility] in [place].

## 2019-09-25 NOTE — TELEPHONE ENCOUNTER
Today bp was 96/82 second time  96/60 when he got up , He went to dialysis today it was 123/55 , He would like someone to look @ his blood pressure machine he is feeling that there maybe something wrong with it , He stated that he just purchased it

## 2019-09-25 NOTE — TELEPHONE ENCOUNTER
Patient concerned that b/p machine is incorrect.  Advised patient he can come to office and we can check for him

## 2019-09-26 NOTE — TELEPHONE ENCOUNTER
Patient came to office today, b/p check against his home machine.  Also decrease ASA 81mg to 2 days per week

## 2019-10-10 PROBLEM — R94.31 EKG, ABNORMAL: Status: ACTIVE | Noted: 2019-01-01

## 2019-11-06 PROBLEM — I87.333 CHRONIC VENOUS HYPERTENSION (IDIOPATHIC) WITH ULCER AND INFLAMMATION OF BILATERAL LOWER EXTREMITY (CODE): Status: ACTIVE | Noted: 2019-01-01

## 2019-11-06 PROBLEM — L97.822 NON-PRESSURE CHRONIC ULCER OF OTHER PART OF LEFT LOWER LEG WITH FAT LAYER EXPOSED (HCC): Status: ACTIVE | Noted: 2019-01-01

## 2019-11-06 PROBLEM — L97.812 NON-PRESSURE CHRONIC ULCER OF OTHER PART OF RIGHT LOWER LEG WITH FAT LAYER EXPOSED (HCC): Status: ACTIVE | Noted: 2019-01-01

## (undated) DEVICE — PACK,BASIC,IX: Brand: MEDLINE

## (undated) DEVICE — SUTURE PROL SZ 5-0 L18IN NONABSORBABLE BLU C-1 L13MM 3/8 8717H

## (undated) DEVICE — PENCIL ES CRD L10FT HND SWCHING ROCK SWCH W/ EDGE COAT BLDE

## (undated) DEVICE — TELFA NON-ADHERENT ABSORBENT DRESSING: Brand: TELFA

## (undated) DEVICE — LOOP,VESSEL,MINI,BLUE,2/PK,STERILE: Brand: MEDLINE

## (undated) DEVICE — MARKER SURG SKIN UTIL REGULAR/FINE 2 TIP W/ RUL AND 9 LBL

## (undated) DEVICE — TUBING SUCT 9 11FR L475IN RIG SHFT MINI SUC TIP DLP

## (undated) DEVICE — CHLORAPREP 26ML ORANGE

## (undated) DEVICE — INTENDED FOR TISSUE SEPARATION, AND OTHER PROCEDURES THAT REQUIRE A SHARP SURGICAL BLADE TO PUNCTURE OR CUT.: Brand: BARD-PARKER ® STAINLESS STEEL BLADES

## (undated) DEVICE — ADAPTER,CATHETER/SYRINGE/LUER,STERILE: Brand: MEDLINE

## (undated) DEVICE — DRAPE,UTILITY,XL,4/PK,STERILE: Brand: MEDLINE

## (undated) DEVICE — SUTURE BOOT: Brand: DEROYAL

## (undated) DEVICE — Z INACTIVE USE 2641837 CLIP LIG M BLU TI HRT SHP WIRE HORZ 600 PER BX

## (undated) DEVICE — ELECTRODE ES AD CRDLSS PT RET REM POLYHESIVE

## (undated) DEVICE — DECANTER FLD 9IN ST BG FOR ASEP TRNSF OF FLD

## (undated) DEVICE — SKIN AFFIX SURG ADHESIVE 72/CS 0.55ML: Brand: MEDLINE

## (undated) DEVICE — TOWEL,OR,DSP,ST,WHITE,DLX,XR,4/PK,20PK/C: Brand: MEDLINE

## (undated) DEVICE — SUTURE MCRYL SZ 4-0 L27IN ABSRB UD L24MM PS-1 3/8 CIR PRIM Y935H

## (undated) DEVICE — SCANLAN® VASCU-STATT® SINGLE-USE BULLDOG CLAMP - MIDI STRAIGHT (WHITE), CLAMPING PRESSURE 25-30G (2/STERILE PKG): Brand: SCANLAN® VASCU-STATT® SINGLE-USE BULLDOG CLAMP

## (undated) DEVICE — DRESSING TRNSPAR W5XL4.5IN FLM SHT SEMIPERMEABLE WIND

## (undated) DEVICE — TUBING, SUCTION, 3/16" X 10', STRAIGHT: Brand: MEDLINE

## (undated) DEVICE — CATHETER IV 22GA L1IN OD0.876MM ID0.648MM BLU VIALON

## (undated) DEVICE — CLIP SM RED INTERN HMOCLP TITAN LIGATING

## (undated) DEVICE — LINER,SEMI-RIGID,3000CC,50EA/CS: Brand: MEDLINE

## (undated) DEVICE — ELECTRODE ES L2.75IN S STL INSUL BLDE W/ SL EDGE

## (undated) DEVICE — SUTURE PERMA-HAND SZ 3-0 L18IN 17 STRND NONABSORBABLE BLK SA64H

## (undated) DEVICE — TOWEL,OR,DSP,ST,BLUE,STD,6/PK,12PK/CS: Brand: MEDLINE

## (undated) DEVICE — DRAPE SURGICAL HAND PROX AURORA

## (undated) DEVICE — 20 ML SYRINGE LUER-LOCK TIP: Brand: MONOJECT

## (undated) DEVICE — CLIP INT SM TI EZ LD LIG SYS WECK HORZ

## (undated) DEVICE — LINER SUCT CANSTR 1500CC SEMI RIG W/ POR HYDROPHOBIC SHUT

## (undated) DEVICE — COUNTER NDL 30 COUNT FOAM STRP SGL MAG

## (undated) DEVICE — LOOP VES W25MM THK1MM MAXI RED SIL FLD REPELLENT 100 PER

## (undated) DEVICE — SUTURE PROL 7-0 L18IN NONABSORBABLE BLU L9.3MM BV-1 3/8 CIR M8701

## (undated) DEVICE — GEL US 20GM NONIRRITATING OVERWRAPPED FILE PCH TRNSMIT

## (undated) DEVICE — ANESTHESIA CIRCUIT ADULT-LF: Brand: MEDLINE INDUSTRIES, INC.

## (undated) DEVICE — SOLUTION IV IRRIG WATER 1000ML POUR BRL 2F7114

## (undated) DEVICE — STANDARD HYPODERMIC NEEDLE,POLYPROPYLENE HUB: Brand: MONOJECT